# Patient Record
Sex: FEMALE | Race: WHITE | NOT HISPANIC OR LATINO | URBAN - METROPOLITAN AREA
[De-identification: names, ages, dates, MRNs, and addresses within clinical notes are randomized per-mention and may not be internally consistent; named-entity substitution may affect disease eponyms.]

---

## 2023-09-12 PROBLEM — F41.9 ANXIETY: Status: ACTIVE | Noted: 2023-09-12

## 2023-09-12 PROBLEM — H02.834 DERMATOCHALASIS OF LEFT UPPER EYELID: Status: ACTIVE | Noted: 2023-09-12

## 2023-09-12 PROBLEM — Z86.718 HISTORY OF DEEP VEIN THROMBOSIS: Status: ACTIVE | Noted: 2023-09-12

## 2023-09-12 PROBLEM — E87.6 HYPOKALEMIA: Status: ACTIVE | Noted: 2023-09-12

## 2023-09-12 PROBLEM — E78.5 DYSLIPIDEMIA: Status: ACTIVE | Noted: 2023-09-12

## 2023-09-12 PROBLEM — K44.9 DIAPHRAGMATIC HERNIA WITHOUT OBSTRUCTION OR GANGRENE: Status: ACTIVE | Noted: 2023-09-12

## 2023-09-12 PROBLEM — H04.129 TEAR FILM INSUFFICIENCY: Status: ACTIVE | Noted: 2023-09-12

## 2023-09-12 PROBLEM — Z96.652 H/O TOTAL KNEE REPLACEMENT, LEFT: Status: ACTIVE | Noted: 2023-09-12

## 2023-09-12 PROBLEM — R06.00 DYSPNEA: Status: ACTIVE | Noted: 2023-09-12

## 2023-09-12 PROBLEM — H35.40 PERIPHERAL RETINAL DEGENERATION: Status: ACTIVE | Noted: 2023-09-12

## 2023-09-12 PROBLEM — H43.399 FLOATERS IN VISUAL FIELD: Status: ACTIVE | Noted: 2023-09-12

## 2023-09-12 PROBLEM — H02.831 DERMATOCHALASIS OF RIGHT UPPER EYELID: Status: ACTIVE | Noted: 2023-09-12

## 2023-09-12 PROBLEM — D62 ANEMIA DUE TO ACUTE BLOOD LOSS: Status: ACTIVE | Noted: 2023-09-12

## 2023-09-12 RX ORDER — SIMETHICONE 80 MG
80 TABLET,CHEWABLE ORAL
COMMUNITY

## 2023-09-12 RX ORDER — HYDROCHLOROTHIAZIDE 25 MG/1
25 TABLET ORAL EVERY MORNING
COMMUNITY

## 2023-09-12 RX ORDER — DICLOFENAC SODIUM 10 MG/G
GEL TOPICAL
COMMUNITY

## 2023-09-12 RX ORDER — TEMAZEPAM 15 MG/1
15 CAPSULE ORAL NIGHTLY PRN
COMMUNITY

## 2023-09-12 RX ORDER — CALCIUM CARBONATE 200(500)MG
1 TABLET,CHEWABLE ORAL DAILY
COMMUNITY

## 2023-09-12 RX ORDER — BUSPIRONE HYDROCHLORIDE 10 MG/1
10 TABLET ORAL 2 TIMES DAILY
COMMUNITY

## 2023-09-12 RX ORDER — LUBIPROSTONE 8 UG/1
8 CAPSULE ORAL
COMMUNITY

## 2023-09-12 RX ORDER — CETIRIZINE HYDROCHLORIDE 10 MG/1
10 TABLET ORAL DAILY
COMMUNITY

## 2023-09-12 RX ORDER — GABAPENTIN 300 MG/1
300 CAPSULE ORAL AS NEEDED
COMMUNITY

## 2023-09-12 RX ORDER — AMLODIPINE BESYLATE 10 MG/1
10 TABLET ORAL DAILY
COMMUNITY

## 2023-09-12 RX ORDER — SUCRALFATE 1 G/1
1 TABLET ORAL
COMMUNITY

## 2023-09-12 RX ORDER — DICYCLOMINE HYDROCHLORIDE 10 MG/1
20 CAPSULE ORAL DAILY PRN
COMMUNITY

## 2023-09-12 RX ORDER — FENOFIBRATE 145 MG/1
145 TABLET, FILM COATED ORAL
COMMUNITY

## 2023-09-12 RX ORDER — MONTELUKAST SODIUM 10 MG/1
10 TABLET ORAL DAILY
COMMUNITY

## 2023-09-12 RX ORDER — OMEPRAZOLE 40 MG/1
40 CAPSULE, DELAYED RELEASE ORAL
COMMUNITY

## 2023-09-12 RX ORDER — PRAVASTATIN SODIUM 10 MG/1
10 TABLET ORAL DAILY
COMMUNITY

## 2023-09-12 RX ORDER — SERTRALINE HYDROCHLORIDE 100 MG/1
100 TABLET, FILM COATED ORAL DAILY
COMMUNITY

## 2023-09-12 RX ORDER — ERGOCALCIFEROL 1.25 MG/1
1 CAPSULE ORAL DAILY
COMMUNITY

## 2023-12-04 ENCOUNTER — HOSPITAL ENCOUNTER (OUTPATIENT)
Dept: RADIOLOGY | Facility: HOSPITAL | Age: 83
Discharge: HOME | End: 2023-12-04
Payer: MEDICARE

## 2023-12-04 DIAGNOSIS — M17.11 UNILATERAL PRIMARY OSTEOARTHRITIS, RIGHT KNEE: ICD-10-CM

## 2023-12-04 DIAGNOSIS — M76.899 OTHER SPECIFIED ENTHESOPATHIES OF UNSPECIFIED LOWER LIMB, EXCLUDING FOOT: ICD-10-CM

## 2023-12-04 PROCEDURE — 73562 X-RAY EXAM OF KNEE 3: CPT | Mod: RT,FY

## 2024-04-12 ENCOUNTER — HOSPITAL ENCOUNTER (OUTPATIENT)
Dept: RADIOLOGY | Facility: HOSPITAL | Age: 84
Discharge: HOME | End: 2024-04-12
Payer: MEDICARE

## 2024-04-12 VITALS — BODY MASS INDEX: 29.4 KG/M2 | WEIGHT: 194 LBS | HEIGHT: 68 IN

## 2024-04-12 DIAGNOSIS — N95.9 UNSPECIFIED MENOPAUSAL AND PERIMENOPAUSAL DISORDER: ICD-10-CM

## 2024-04-12 DIAGNOSIS — Z12.31 ENCOUNTER FOR SCREENING MAMMOGRAM FOR MALIGNANT NEOPLASM OF BREAST: ICD-10-CM

## 2024-04-12 PROCEDURE — 77067 SCR MAMMO BI INCL CAD: CPT | Performed by: RADIOLOGY

## 2024-04-12 PROCEDURE — 77080 DXA BONE DENSITY AXIAL: CPT | Performed by: RADIOLOGY

## 2024-04-12 PROCEDURE — 77080 DXA BONE DENSITY AXIAL: CPT

## 2024-04-12 PROCEDURE — 77063 BREAST TOMOSYNTHESIS BI: CPT | Performed by: RADIOLOGY

## 2024-04-12 PROCEDURE — 77067 SCR MAMMO BI INCL CAD: CPT

## 2024-07-16 ENCOUNTER — OFFICE VISIT (OUTPATIENT)
Dept: OPHTHALMOLOGY | Facility: CLINIC | Age: 84
End: 2024-07-16
Payer: MEDICARE

## 2024-07-16 ENCOUNTER — CLINICAL SUPPORT (OUTPATIENT)
Dept: OPHTHALMOLOGY | Facility: CLINIC | Age: 84
End: 2024-07-16
Payer: MEDICARE

## 2024-07-16 DIAGNOSIS — H02.831 DERMATOCHALASIS OF BOTH UPPER EYELIDS: ICD-10-CM

## 2024-07-16 DIAGNOSIS — Z96.1 PSEUDOPHAKIA: ICD-10-CM

## 2024-07-16 DIAGNOSIS — H04.123 INSUFFICIENCY OF TEAR FILM OF BOTH EYES: ICD-10-CM

## 2024-07-16 DIAGNOSIS — H43.393 VITREOUS FLOATERS OF BOTH EYES: ICD-10-CM

## 2024-07-16 DIAGNOSIS — H35.40 PERIPHERAL RETINAL DEGENERATION OF BOTH EYES: Primary | ICD-10-CM

## 2024-07-16 DIAGNOSIS — H52.7 REFRACTIVE ERROR: ICD-10-CM

## 2024-07-16 DIAGNOSIS — H02.834 DERMATOCHALASIS OF BOTH UPPER EYELIDS: ICD-10-CM

## 2024-07-16 PROBLEM — Z86.718 HISTORY OF DEEP VEIN THROMBOSIS: Status: RESOLVED | Noted: 2023-09-12 | Resolved: 2024-07-16

## 2024-07-16 PROCEDURE — 99214 OFFICE O/P EST MOD 30 MIN: CPT | Performed by: OPHTHALMOLOGY

## 2024-07-16 PROCEDURE — 92015 DETERMINE REFRACTIVE STATE: CPT | Performed by: OPHTHALMOLOGY

## 2024-07-16 PROCEDURE — 92134 CPTRZ OPH DX IMG PST SGM RTA: CPT | Performed by: OPHTHALMOLOGY

## 2024-07-16 RX ORDER — ALPRAZOLAM 0.5 MG/1
1 TABLET ORAL
COMMUNITY
Start: 2023-10-20

## 2024-07-16 RX ORDER — FLUTICASONE PROPIONATE 50 MCG
1 SPRAY, SUSPENSION (ML) NASAL DAILY
COMMUNITY
Start: 2024-01-18

## 2024-07-16 RX ORDER — LIDOCAINE 50 MG/G
PATCH TOPICAL
COMMUNITY
Start: 2024-05-31

## 2024-07-16 ASSESSMENT — REFRACTION_WEARINGRX
OS_CYLINDER: -0.75
OD_CYLINDER: -1.25
OD_ADD: +2.75
OS_ADD: +2.75
OD_AXIS: 091
SPECS_TYPE: BIFOCAL
OS_AXIS: 133
OD_SPHERE: -0.50
OS_SPHERE: +0.50

## 2024-07-16 ASSESSMENT — VISUAL ACUITY
OS_CC+: +1
CORRECTION_TYPE: GLASSES
METHOD: SNELLEN - SINGLE
OS_CC: 20/30
OD_CC+: -2
OD_CC: 20/30

## 2024-07-16 ASSESSMENT — REFRACTION_MANIFEST
OS_AXIS: 100
OD_AXIS: 090
OD_CYLINDER: -2.75
OS_SPHERE: +1.50
OS_CYLINDER: -1.25
OD_SPHERE: +0.25
METHOD_AUTOREFRACTION: 1

## 2024-07-16 ASSESSMENT — ENCOUNTER SYMPTOMS
NEUROLOGICAL NEGATIVE: 0
PSYCHIATRIC NEGATIVE: 0
EYES NEGATIVE: 0
ENDOCRINE NEGATIVE: 0
HEMATOLOGIC/LYMPHATIC NEGATIVE: 0
CARDIOVASCULAR NEGATIVE: 0
MUSCULOSKELETAL NEGATIVE: 0
RESPIRATORY NEGATIVE: 0
GASTROINTESTINAL NEGATIVE: 0
CONSTITUTIONAL NEGATIVE: 0
ALLERGIC/IMMUNOLOGIC NEGATIVE: 0

## 2024-07-16 ASSESSMENT — CUP TO DISC RATIO
OS_RATIO: 0.35
OD_RATIO: 0.35

## 2024-07-16 ASSESSMENT — KERATOMETRY
OS_AXISANGLE2_DEGREES: 135
OS_K2POWER_DIOPTERS: 43.75
OD_K1POWER_DIOPTERS: 41.75
OS_AXISANGLE_DEGREES: 45
OD_AXISANGLE2_DEGREES: 90
OS_K1POWER_DIOPTERS: 43.00
OD_K2POWER_DIOPTERS: 43.25
OD_AXISANGLE_DEGREES: 180
METHOD_AUTO_MANUAL: AUTOMATED

## 2024-07-16 ASSESSMENT — PATIENT HEALTH QUESTIONNAIRE - PHQ9
SUM OF ALL RESPONSES TO PHQ9 QUESTIONS 1 AND 2: 0
1. LITTLE INTEREST OR PLEASURE IN DOING THINGS: NOT AT ALL
2. FEELING DOWN, DEPRESSED OR HOPELESS: NOT AT ALL

## 2024-07-16 ASSESSMENT — PAIN SCALES - GENERAL: PAINLEVEL: 0-NO PAIN

## 2024-07-16 ASSESSMENT — TONOMETRY
IOP_METHOD: GOLDMANN APPLANATION
OD_IOP_MMHG: 16
OS_IOP_MMHG: 16

## 2024-07-16 ASSESSMENT — EXTERNAL EXAM - LEFT EYE: OS_EXAM: BROW PTOSIS

## 2024-07-16 ASSESSMENT — EXTERNAL EXAM - RIGHT EYE: OD_EXAM: BROW PTOSIS

## 2024-07-16 ASSESSMENT — SLIT LAMP EXAM - LIDS
COMMENTS: 1+ DERMATOCHALASIS - UPPER LID, 1+ BLEPHARITIS
COMMENTS: 1+ DERMATOCHALASIS - UPPER LID, 1+ BLEPHARITIS

## 2024-07-16 NOTE — PROGRESS NOTES
Subjective   Patient ID: Francine Romero is a 84 y.o. female.    Chief Complaint    Annual Exam       HPI    Noting difficulties focusing when not using lubricating drops (gtts).    Complete and macular exam.  No new changes in health history or meds.  Vision is good and stable.  No new problems or complaints.    Last edited by Alejandro Velasquez MD on 7/16/2024 11:32 AM.        No current outpatient medications on file. (Ophthalmology pharm classes)       Current Outpatient Medications (Other)   Medication Sig Dispense Refill    ALPRAZolam (Xanax) 0.5 mg tablet Take 1 tablet (0.5 mg) by mouth early in the morning..      amLODIPine (Norvasc) 10 mg tablet Take 1 tablet (10 mg) by mouth once daily.      busPIRone (Buspar) 10 mg tablet Take 1 tablet (10 mg) by mouth 2 times a day.      calcium carbonate (Tums) 200 mg calcium chewable tablet Chew 1 tablet (500 mg) once daily.      cetirizine (ZyrTEC) 10 mg tablet Take 1 tablet (10 mg) by mouth once daily.      diclofenac sodium (Voltaren) 1 % gel gel as directed Transdermal      dicyclomine (Bentyl) 10 mg capsule Take 2 capsules (20 mg) by mouth once daily as needed.      ergocalciferol (Vitamin D-2) 1.25 MG (75237 UT) capsule Take 1 capsule (1,250 mcg) by mouth once daily.      fenofibrate (Tricor) 145 mg tablet Take 1 tablet (145 mg) by mouth once daily in the evening. Take with meals.      fluticasone (Flonase) 50 mcg/actuation nasal spray Administer 1 spray into each nostril once daily. shake liquid      fluticasone furoate (Arnuity Ellipta) 50 mcg/actuation inhaler Inhale 2 puffs once daily.      gabapentin (Neurontin) 300 mg capsule Take 1 capsule (300 mg) by mouth if needed.      hydroCHLOROthiazide (HYDRODiuril) 25 mg tablet Take 1 tablet (25 mg) by mouth once daily in the morning.      lidocaine (Lidoderm) 5 % patch APPLY 1 PATCH TOPICALLY ONCE DAILY. REMOVE AFTER 12 HOURS      lubiprostone (Amitiza) 8 mcg capsule Take 1 capsule (8 mcg) by mouth 2 times daily  (morning and late afternoon).  1 capsule with food and water Orally Twice a day for 30 day(s)      montelukast (Singulair) 10 mg tablet Take 1 tablet (10 mg) by mouth once daily.      multivitamin/iron/folic acid (CENTRUM WOMEN ORAL) as directed Orally      omeprazole (PriLOSEC) 40 mg DR capsule Take 1 capsule (40 mg) by mouth once daily in the morning. Take before meals.      pedi mv no.227/ferrous sulfate (FLINTSTONES COMPLETE, FE SULF, ORAL) Take 1 tablet by mouth once daily.      pravastatin (Pravachol) 10 mg tablet Take 1 tablet (10 mg) by mouth once daily.      sertraline (Zoloft) 100 mg tablet Take 1 tablet (100 mg) by mouth once daily.      simethicone (Mylicon) 80 mg chewable tablet Chew 1 tablet (80 mg) 2 times a day.  1 tablet after meals and at bedtime as needed Orally Four times a day      sucralfate (Carafate) 1 gram tablet Take 1 tablet (1 g) by mouth once daily in the morning. Take before meals.      temazepam (Restoril) 15 mg capsule Take 1 capsule (15 mg) by mouth as needed at bedtime.         Objective   Base Eye Exam       Visual Acuity (Snellen - Single)         Right Left Both    Dist cc 20/30 -2 20/30 +1     Near cc   J1      Correction: Glasses              Tonometry (Goldmann Applanation, 10:40 AM)         Right Left    Pressure 16 16              Pupils         Dark Shape React APD    Right 4 Round 2 None    Left 4 Round 2 None              Extraocular Movement         Right Left     Full Full              Dilation       Both eyes: 1% Tropic 2.5% Phen @ 10:40 AM                  Additional Tests       Keratometry (Automated)         K1 Axis K2 Axis    Right 41.75 90 43.25 180    Left 43.00 135 43.75 45                  Slit Lamp and Fundus Exam       External Exam         Right Left    External Brow ptosis Brow ptosis              Slit Lamp Exam         Right Left    Lids/Lashes 1+ Dermatochalasis - upper lid, 1+ Blepharitis 1+ Dermatochalasis - upper lid, 1+ Blepharitis     Conjunctiva/Sclera normal bulbar and palepbral conjunctiva normal bulbar and palepbral conjunctiva    Cornea normal epi/stroma/endo and tear film normal epi/stroma/endo and tear film    Anterior Chamber normal anterior chamber, deep and quiet normal anterior chamber, deep and quiet    Iris iris normal iris normal    Lens BRJ Posterior chamber intraocular lens, Open posterior capsule BDP Centered posterior chamber intraocular lens, Open posterior capsule    Anterior Vitreous vitreous syneresis vitreous syneresis              Fundus Exam         Right Left    Disc normal optic nerve normal optic nerve    C/D Ratio 0.35 0.35    Macula normal macula normal macula    Vessels normal retinal vessels normal retinal vessels    Periphery peripheral retinal degeneration peripheral retinal degeneration                  Refraction       Wearing Rx         Sphere Cylinder Axis Add    Right -0.50 -1.25 091 +2.75    Left +0.50 -0.75 133 +2.75      Type: Bifocal              Manifest Refraction (Auto)         Sphere Cylinder Axis    Right +0.25 -2.75 090    Left +1.50 -1.25 100      Pupillary Distance: 62              Final Rx         Sphere Cylinder Hope Dist VA Add Near VA    Right -0.25 -1.50 090 20/30 +2.75 J1    Left +0.75 -0.75 100 20/25 +2.75 J1      Type: Bifocal    Expiration Date: 7/16/2026    Pupillary Distance: 62                    Assessment/Plan   Problem List Items Addressed This Visit          Eye/Vision problems    Dermatochalasis of both upper eyelids    Floaters in visual field    Peripheral retinal degeneration of both eyes - Primary    Relevant Orders    OCT, Retina - OU - Both Eyes (Completed)    Tear film insufficiency    Pseudophakia     F/u one year for a full exam.  Unchanged rx given.          Refractive error

## 2024-12-30 ENCOUNTER — OFFICE VISIT (OUTPATIENT)
Dept: CARDIOLOGY | Facility: CLINIC | Age: 84
End: 2024-12-30
Payer: MEDICARE

## 2024-12-30 VITALS
WEIGHT: 198 LBS | BODY MASS INDEX: 30.11 KG/M2 | DIASTOLIC BLOOD PRESSURE: 64 MMHG | OXYGEN SATURATION: 96 % | SYSTOLIC BLOOD PRESSURE: 112 MMHG | HEART RATE: 89 BPM

## 2024-12-30 DIAGNOSIS — R94.31 ABNORMAL EKG: Primary | ICD-10-CM

## 2024-12-30 PROCEDURE — 1159F MED LIST DOCD IN RCRD: CPT | Performed by: INTERNAL MEDICINE

## 2024-12-30 PROCEDURE — 1036F TOBACCO NON-USER: CPT | Performed by: INTERNAL MEDICINE

## 2024-12-30 PROCEDURE — 99213 OFFICE O/P EST LOW 20 MIN: CPT | Mod: 25 | Performed by: INTERNAL MEDICINE

## 2024-12-30 PROCEDURE — 1126F AMNT PAIN NOTED NONE PRSNT: CPT | Performed by: INTERNAL MEDICINE

## 2024-12-30 PROCEDURE — 93010 ELECTROCARDIOGRAM REPORT: CPT | Performed by: INTERNAL MEDICINE

## 2024-12-30 PROCEDURE — 93005 ELECTROCARDIOGRAM TRACING: CPT | Performed by: INTERNAL MEDICINE

## 2024-12-30 PROCEDURE — 99203 OFFICE O/P NEW LOW 30 MIN: CPT | Performed by: INTERNAL MEDICINE

## 2024-12-30 ASSESSMENT — ENCOUNTER SYMPTOMS
OCCASIONAL FEELINGS OF UNSTEADINESS: 0
ENDOCRINE NEGATIVE: 1
NEUROLOGICAL NEGATIVE: 1
COUGH: 0
DEPRESSION: 0
MUSCULOSKELETAL NEGATIVE: 1
RESPIRATORY NEGATIVE: 1
FALLS: 0
EYES NEGATIVE: 1
CONSTITUTIONAL NEGATIVE: 1
CHILLS: 0
GASTROINTESTINAL NEGATIVE: 1
LOSS OF SENSATION IN FEET: 1
FEVER: 0

## 2024-12-30 ASSESSMENT — PATIENT HEALTH QUESTIONNAIRE - PHQ9
SUM OF ALL RESPONSES TO PHQ9 QUESTIONS 1 AND 2: 0
SUM OF ALL RESPONSES TO PHQ9 QUESTIONS 1 AND 2: 0
1. LITTLE INTEREST OR PLEASURE IN DOING THINGS: NOT AT ALL
2. FEELING DOWN, DEPRESSED OR HOPELESS: NOT AT ALL
2. FEELING DOWN, DEPRESSED OR HOPELESS: NOT AT ALL
1. LITTLE INTEREST OR PLEASURE IN DOING THINGS: NOT AT ALL

## 2024-12-30 ASSESSMENT — PAIN SCALES - GENERAL: PAINLEVEL_OUTOF10: 0-NO PAIN

## 2024-12-30 NOTE — ASSESSMENT & PLAN NOTE
This is an 84-year-old white female who does have an abnormal EKG.  In 2020 the EKG was also abnormal and similar.  She has had stress test which are negative.  She has EKGs where the T waves are flipped and sometimes a heart.  This may be a normal variant for her.  Stress test in 2023 was negative.  Would like to get an echocardiogram to make sure there is no valvular or muscular problems she has no murmurs at this time that I can hear and of note there is no murmurs with Valsalva.  Will call with results of the echocardiogram

## 2024-12-30 NOTE — PROGRESS NOTES
Subjective      Chief Complaint   Patient presents with    dr hammonds referred for cardiac eval          This is an 84-year-old white female who is transferring to our service.  She does have an abnormal EKG which she has had for quite some time.  Her EKG today shows a normal sinus rhythm with flipped T waves in the anterior lateral leads.  She had a stress test in 2023 EKG at that time did not show this.  However going back EKG in 2020 did show these changes.  She has no symptoms of chest pain chest pressure or discomforts.  She never had a myocardial infarction.  She is not complaining of symptoms of PND or orthopnea.  Her legs are not swelling up on her.  She does have a history of hypertension as well as hypercholesterolemia.  She is not a diabetic there is no family's coronary disease she is never smoker and states she never a myocardial infarction.  There is no history of rheumatic fever or heart murmur.  She is never told she had muscle problem.  She remains active and is doing fairly well.  She has never had a stroke nor symptoms of intermittent claudication.           Review of Systems   Constitutional: Negative. Negative for chills and fever.   HENT: Negative.     Eyes: Negative.    Respiratory: Negative.  Negative for cough.    Endocrine: Negative.    Skin: Negative.    Musculoskeletal: Negative.  Negative for falls.   Gastrointestinal: Negative.    Genitourinary: Negative.    Neurological: Negative.    All other systems reviewed and are negative.       Past Surgical History:   Procedure Laterality Date    CATARACT EXTRACTION W/  INTRAOCULAR LENS IMPLANT Right 11/10/2002    +15.5 D    CATARACT EXTRACTION W/  INTRAOCULAR LENS IMPLANT Left 11/09/2017    +13.0 D    HYSTERECTOMY      KNEE ARTHROPLASTY      2022        Active Ambulatory Problems     Diagnosis Date Noted    H/O total knee replacement, left 09/12/2023    Anemia due to acute blood loss 09/12/2023    Anxiety 09/12/2023    Dermatochalasis of both  "upper eyelids 09/12/2023    Diaphragmatic hernia without obstruction or gangrene 09/12/2023    Dyslipidemia 09/12/2023    Dyspnea 09/12/2023    Floaters in visual field 09/12/2023    Hypokalemia 09/12/2023    Peripheral retinal degeneration of both eyes 09/12/2023    Tear film insufficiency 09/12/2023    Pseudophakia 07/16/2024    Refractive error 07/16/2024    Abnormal EKG 12/30/2024     Resolved Ambulatory Problems     Diagnosis Date Noted    Dermatochalasis of left upper eyelid 09/12/2023    History of deep vein thrombosis 09/12/2023     Past Medical History:   Diagnosis Date    Dry eye syndrome of bilateral lacrimal glands     Other vitreous opacities, bilateral     Presence of intraocular lens     S/P YAG capsulotomy, bilateral     Unspecified disorder of refraction     Unspecified peripheral retinal degeneration         Visit Vitals  /64   Pulse 89   Wt 89.8 kg (198 lb)   LMP  (LMP Unknown)   SpO2 96%   BMI 30.11 kg/m²   OB Status Hysterectomy   Smoking Status Former   BSA 2.08 m²        Objective     Physical Exam       Lab Review:         Lab Results   Component Value Date    CHOL 167 05/21/2020     Lab Results   Component Value Date    HDL 63 05/21/2020     Lab Results   Component Value Date    LDLCALC 84 05/21/2020     Lab Results   Component Value Date    TRIG 101 05/21/2020     No components found for: \"CHOLHDL\"     Assessment/Plan     Abnormal EKG  This is an 84-year-old white female who does have an abnormal EKG.  In 2020 the EKG was also abnormal and similar.  She has had stress test which are negative.  She has EKGs where the T waves are flipped and sometimes a heart.  This may be a normal variant for her.  Stress test in 2023 was negative.  Would like to get an echocardiogram to make sure there is no valvular or muscular problems she has no murmurs at this time that I can hear and of note there is no murmurs with Valsalva.  Will call with results of the echocardiogram     "

## 2025-01-09 ENCOUNTER — HOSPITAL ENCOUNTER (OUTPATIENT)
Dept: CARDIOLOGY | Facility: HOSPITAL | Age: 85
Discharge: HOME | End: 2025-01-09
Payer: MEDICARE

## 2025-01-09 DIAGNOSIS — R94.31 ABNORMAL EKG: ICD-10-CM

## 2025-01-09 LAB
AORTIC VALVE PEAK VELOCITY: 1.48 M/S
AV PEAK GRADIENT: 9 MMHG
AVA (PEAK VEL): 2.76 CM2
EJECTION FRACTION APICAL 4 CHAMBER: 74.8
EJECTION FRACTION: 63 %
LEFT ATRIUM VOLUME AREA LENGTH INDEX BSA: 31.2 ML/M2
LEFT VENTRICULAR OUTFLOW TRACT DIAMETER: 2.06 CM
LV EJECTION FRACTION BIPLANE: 68 %
MITRAL VALVE E/A RATIO: 0.85
MITRAL VALVE E/E' RATIO: 7.81
RIGHT VENTRICLE FREE WALL PEAK S': 15.57 CM/S
RIGHT VENTRICLE PEAK SYSTOLIC PRESSURE: 22.2 MMHG

## 2025-01-09 PROCEDURE — 93306 TTE W/DOPPLER COMPLETE: CPT

## 2025-01-09 PROCEDURE — 93306 TTE W/DOPPLER COMPLETE: CPT | Performed by: INTERNAL MEDICINE

## 2025-01-13 ENCOUNTER — TELEPHONE (OUTPATIENT)
Dept: CARDIOLOGY | Facility: CLINIC | Age: 85
End: 2025-01-13
Payer: MEDICARE

## 2025-01-13 NOTE — TELEPHONE ENCOUNTER
Called with results of the echocardiogram showing no abnormalities and reassurance with the EKG being abnormal for quite some time

## 2025-04-25 ENCOUNTER — HOSPITAL ENCOUNTER (OUTPATIENT)
Dept: RADIOLOGY | Facility: HOSPITAL | Age: 85
Discharge: HOME | End: 2025-04-25
Payer: MEDICARE

## 2025-04-25 ENCOUNTER — APPOINTMENT (OUTPATIENT)
Dept: CARDIOLOGY | Facility: HOSPITAL | Age: 85
DRG: 505 | End: 2025-04-25
Payer: MEDICARE

## 2025-04-25 ENCOUNTER — HOSPITAL ENCOUNTER (INPATIENT)
Facility: HOSPITAL | Age: 85
LOS: 3 days | Discharge: NURSING, REHAB OR LONG-TERM CARE | DRG: 505 | End: 2025-04-29
Attending: INTERNAL MEDICINE | Admitting: INTERNAL MEDICINE
Payer: MEDICARE

## 2025-04-25 DIAGNOSIS — S92.309A CLOSED DISPLACED FRACTURE OF METATARSAL BONE, UNSPECIFIED LATERALITY, UNSPECIFIED METATARSAL, INITIAL ENCOUNTER: Primary | ICD-10-CM
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
## 2025-04-25 LAB
ABO GROUP (TYPE) IN BLOOD: NORMAL
ABO GROUP (TYPE) IN BLOOD: NORMAL
ALBUMIN SERPL BCP-MCNC: 4.3 G/DL (ref 3.4–5)
ALP SERPL-CCNC: 78 U/L (ref 33–136)
ALT SERPL W P-5'-P-CCNC: 14 U/L (ref 7–45)
ANION GAP SERPL CALCULATED.3IONS-SCNC: 12 MMOL/L (ref 10–20)
ANTIBODY SCREEN: NORMAL
AST SERPL W P-5'-P-CCNC: 22 U/L (ref 9–39)
BASOPHILS # BLD AUTO: 0.13 X10*3/UL (ref 0–0.1)
BASOPHILS NFR BLD AUTO: 1.7 %
BILIRUB SERPL-MCNC: 0.7 MG/DL (ref 0–1.2)
BUN SERPL-MCNC: 23 MG/DL (ref 6–23)
CALCIUM SERPL-MCNC: 10 MG/DL (ref 8.6–10.3)
CHLORIDE SERPL-SCNC: 103 MMOL/L (ref 98–107)
CO2 SERPL-SCNC: 28 MMOL/L (ref 21–32)
CREAT SERPL-MCNC: 0.84 MG/DL (ref 0.5–1.05)
EGFRCR SERPLBLD CKD-EPI 2021: 68 ML/MIN/1.73M*2
EOSINOPHIL # BLD AUTO: 0.29 X10*3/UL (ref 0–0.4)
EOSINOPHIL NFR BLD AUTO: 3.8 %
ERYTHROCYTE [DISTWIDTH] IN BLOOD BY AUTOMATED COUNT: 13.2 % (ref 11.5–14.5)
GLUCOSE SERPL-MCNC: 97 MG/DL (ref 74–99)
HCT VFR BLD AUTO: 40.4 % (ref 36–46)
HGB BLD-MCNC: 13.7 G/DL (ref 12–16)
IMM GRANULOCYTES # BLD AUTO: 0.03 X10*3/UL (ref 0–0.5)
IMM GRANULOCYTES NFR BLD AUTO: 0.4 % (ref 0–0.9)
LYMPHOCYTES # BLD AUTO: 1.66 X10*3/UL (ref 0.8–3)
LYMPHOCYTES NFR BLD AUTO: 21.8 %
MCH RBC QN AUTO: 31.6 PG (ref 26–34)
MCHC RBC AUTO-ENTMCNC: 33.9 G/DL (ref 32–36)
MCV RBC AUTO: 93 FL (ref 80–100)
MONOCYTES # BLD AUTO: 0.61 X10*3/UL (ref 0.05–0.8)
MONOCYTES NFR BLD AUTO: 8 %
NEUTROPHILS # BLD AUTO: 4.88 X10*3/UL (ref 1.6–5.5)
NEUTROPHILS NFR BLD AUTO: 64.3 %
NRBC BLD-RTO: 0 /100 WBCS (ref 0–0)
PLATELET # BLD AUTO: 376 X10*3/UL (ref 150–450)
POTASSIUM SERPL-SCNC: 3.8 MMOL/L (ref 3.5–5.3)
PROT SERPL-MCNC: 7.7 G/DL (ref 6.4–8.2)
RBC # BLD AUTO: 4.34 X10*6/UL (ref 4–5.2)
RH FACTOR (ANTIGEN D): NORMAL
RH FACTOR (ANTIGEN D): NORMAL
SODIUM SERPL-SCNC: 139 MMOL/L (ref 136–145)
WBC # BLD AUTO: 7.6 X10*3/UL (ref 4.4–11.3)

## 2025-04-25 PROCEDURE — G0378 HOSPITAL OBSERVATION PER HR: HCPCS

## 2025-04-25 PROCEDURE — 86901 BLOOD TYPING SEROLOGIC RH(D): CPT | Performed by: PHYSICIAN ASSISTANT

## 2025-04-25 PROCEDURE — 73630 X-RAY EXAM OF FOOT: CPT | Mod: LT

## 2025-04-25 PROCEDURE — 2500000001 HC RX 250 WO HCPCS SELF ADMINISTERED DRUGS (ALT 637 FOR MEDICARE OP): Performed by: INTERNAL MEDICINE

## 2025-04-25 PROCEDURE — 99285 EMERGENCY DEPT VISIT HI MDM: CPT

## 2025-04-25 PROCEDURE — 2500000002 HC RX 250 W HCPCS SELF ADMINISTERED DRUGS (ALT 637 FOR MEDICARE OP, ALT 636 FOR OP/ED): Performed by: INTERNAL MEDICINE

## 2025-04-25 PROCEDURE — 73610 X-RAY EXAM OF ANKLE: CPT | Mod: LT

## 2025-04-25 PROCEDURE — 85025 COMPLETE CBC W/AUTO DIFF WBC: CPT | Performed by: PHYSICIAN ASSISTANT

## 2025-04-25 PROCEDURE — 84075 ASSAY ALKALINE PHOSPHATASE: CPT | Performed by: PHYSICIAN ASSISTANT

## 2025-04-25 PROCEDURE — 93005 ELECTROCARDIOGRAM TRACING: CPT

## 2025-04-25 PROCEDURE — 36415 COLL VENOUS BLD VENIPUNCTURE: CPT | Performed by: PHYSICIAN ASSISTANT

## 2025-04-25 PROCEDURE — 73564 X-RAY EXAM KNEE 4 OR MORE: CPT | Mod: 50

## 2025-04-25 RX ORDER — LUBIPROSTONE 8 UG/1
8 CAPSULE ORAL
Status: DISCONTINUED | OUTPATIENT
Start: 2025-04-26 | End: 2025-04-29 | Stop reason: HOSPADM

## 2025-04-25 RX ORDER — TEMAZEPAM 15 MG/1
15 CAPSULE ORAL NIGHTLY PRN
Status: DISCONTINUED | OUTPATIENT
Start: 2025-04-25 | End: 2025-04-29 | Stop reason: HOSPADM

## 2025-04-25 RX ORDER — ACETAMINOPHEN 500 MG
1000 TABLET ORAL EVERY 6 HOURS PRN
Status: DISCONTINUED | OUTPATIENT
Start: 2025-04-25 | End: 2025-04-29 | Stop reason: HOSPADM

## 2025-04-25 RX ORDER — IBUPROFEN 400 MG/1
400 TABLET ORAL EVERY 6 HOURS PRN
Status: DISCONTINUED | OUTPATIENT
Start: 2025-04-25 | End: 2025-04-29 | Stop reason: HOSPADM

## 2025-04-25 RX ORDER — FENOFIBRATE 160 MG/1
160 TABLET ORAL DAILY
Status: DISCONTINUED | OUTPATIENT
Start: 2025-04-26 | End: 2025-04-29 | Stop reason: HOSPADM

## 2025-04-25 RX ORDER — SERTRALINE HYDROCHLORIDE 100 MG/1
100 TABLET, FILM COATED ORAL DAILY
Status: DISCONTINUED | OUTPATIENT
Start: 2025-04-26 | End: 2025-04-29 | Stop reason: HOSPADM

## 2025-04-25 RX ORDER — GABAPENTIN 300 MG/1
300 CAPSULE ORAL 2 TIMES DAILY PRN
Status: DISCONTINUED | OUTPATIENT
Start: 2025-04-25 | End: 2025-04-29 | Stop reason: HOSPADM

## 2025-04-25 RX ORDER — PRAVASTATIN SODIUM 10 MG/1
10 TABLET ORAL NIGHTLY
Status: DISCONTINUED | OUTPATIENT
Start: 2025-04-25 | End: 2025-04-29 | Stop reason: HOSPADM

## 2025-04-25 RX ORDER — SUCRALFATE 1 G/1
1 TABLET ORAL DAILY PRN
Status: DISCONTINUED | OUTPATIENT
Start: 2025-04-26 | End: 2025-04-29 | Stop reason: HOSPADM

## 2025-04-25 RX ORDER — CALCIUM CARBONATE 200(500)MG
1 TABLET,CHEWABLE ORAL DAILY PRN
Status: DISCONTINUED | OUTPATIENT
Start: 2025-04-25 | End: 2025-04-29 | Stop reason: HOSPADM

## 2025-04-25 RX ORDER — AMLODIPINE BESYLATE 10 MG/1
10 TABLET ORAL DAILY
Status: DISCONTINUED | OUTPATIENT
Start: 2025-04-26 | End: 2025-04-29 | Stop reason: HOSPADM

## 2025-04-25 RX ORDER — ALPRAZOLAM 0.5 MG/1
0.5 TABLET ORAL
Status: DISCONTINUED | OUTPATIENT
Start: 2025-04-26 | End: 2025-04-29 | Stop reason: HOSPADM

## 2025-04-25 RX ORDER — IBUPROFEN 200 MG
200-400 TABLET ORAL EVERY 6 HOURS PRN
COMMUNITY

## 2025-04-25 RX ORDER — MONTELUKAST SODIUM 10 MG/1
10 TABLET ORAL
Status: DISCONTINUED | OUTPATIENT
Start: 2025-04-26 | End: 2025-04-29 | Stop reason: HOSPADM

## 2025-04-25 RX ORDER — CETIRIZINE HYDROCHLORIDE 10 MG/1
10 TABLET ORAL DAILY PRN
Status: DISCONTINUED | OUTPATIENT
Start: 2025-04-25 | End: 2025-04-29 | Stop reason: HOSPADM

## 2025-04-25 RX ORDER — FLUTICASONE PROPIONATE 50 MCG
1 SPRAY, SUSPENSION (ML) NASAL DAILY PRN
Status: DISCONTINUED | OUTPATIENT
Start: 2025-04-25 | End: 2025-04-29 | Stop reason: HOSPADM

## 2025-04-25 RX ORDER — ERGOCALCIFEROL 1.25 MG/1
1.25 CAPSULE ORAL WEEKLY
Status: DISCONTINUED | OUTPATIENT
Start: 2025-04-25 | End: 2025-04-29 | Stop reason: HOSPADM

## 2025-04-25 RX ORDER — BUSPIRONE HYDROCHLORIDE 10 MG/1
10 TABLET ORAL 2 TIMES DAILY
Status: DISCONTINUED | OUTPATIENT
Start: 2025-04-25 | End: 2025-04-29 | Stop reason: HOSPADM

## 2025-04-25 RX ORDER — PANTOPRAZOLE SODIUM 20 MG/1
20 TABLET, DELAYED RELEASE ORAL
Status: DISCONTINUED | OUTPATIENT
Start: 2025-04-26 | End: 2025-04-29 | Stop reason: HOSPADM

## 2025-04-25 RX ORDER — SIMETHICONE 80 MG
80 TABLET,CHEWABLE ORAL 4 TIMES DAILY PRN
Status: DISCONTINUED | OUTPATIENT
Start: 2025-04-25 | End: 2025-04-29 | Stop reason: HOSPADM

## 2025-04-25 RX ORDER — ACETAMINOPHEN 500 MG
1000 TABLET ORAL EVERY 6 HOURS PRN
COMMUNITY

## 2025-04-25 RX ORDER — HYDROCHLOROTHIAZIDE 25 MG/1
25 TABLET ORAL EVERY MORNING
Status: DISCONTINUED | OUTPATIENT
Start: 2025-04-26 | End: 2025-04-29 | Stop reason: HOSPADM

## 2025-04-25 RX ADMIN — ERGOCALCIFEROL 1.25 MG: 1.25 CAPSULE ORAL at 22:14

## 2025-04-25 RX ADMIN — TEMAZEPAM 15 MG: 15 CAPSULE ORAL at 22:14

## 2025-04-25 RX ADMIN — PRAVASTATIN SODIUM 10 MG: 10 TABLET ORAL at 23:14

## 2025-04-25 RX ADMIN — ACETAMINOPHEN 1000 MG: 500 TABLET ORAL at 22:17

## 2025-04-25 RX ADMIN — BUSPIRONE HYDROCHLORIDE 10 MG: 10 TABLET ORAL at 22:14

## 2025-04-25 SDOH — SOCIAL STABILITY: SOCIAL INSECURITY
WITHIN THE LAST YEAR, HAVE YOU BEEN RAPED OR FORCED TO HAVE ANY KIND OF SEXUAL ACTIVITY BY YOUR PARTNER OR EX-PARTNER?: NO

## 2025-04-25 SDOH — ECONOMIC STABILITY: HOUSING INSECURITY: IN THE PAST 12 MONTHS, HOW MANY TIMES HAVE YOU MOVED WHERE YOU WERE LIVING?: 1

## 2025-04-25 SDOH — ECONOMIC STABILITY: FOOD INSECURITY: WITHIN THE PAST 12 MONTHS, YOU WORRIED THAT YOUR FOOD WOULD RUN OUT BEFORE YOU GOT THE MONEY TO BUY MORE.: NEVER TRUE

## 2025-04-25 SDOH — SOCIAL STABILITY: SOCIAL INSECURITY: HAVE YOU HAD THOUGHTS OF HARMING ANYONE ELSE?: NO

## 2025-04-25 SDOH — SOCIAL STABILITY: SOCIAL NETWORK
DO YOU BELONG TO ANY CLUBS OR ORGANIZATIONS SUCH AS CHURCH GROUPS, UNIONS, FRATERNAL OR ATHLETIC GROUPS, OR SCHOOL GROUPS?: PATIENT DECLINED

## 2025-04-25 SDOH — SOCIAL STABILITY: SOCIAL NETWORK
IN A TYPICAL WEEK, HOW MANY TIMES DO YOU TALK ON THE PHONE WITH FAMILY, FRIENDS, OR NEIGHBORS?: MORE THAN THREE TIMES A WEEK

## 2025-04-25 SDOH — ECONOMIC STABILITY: FOOD INSECURITY: HOW HARD IS IT FOR YOU TO PAY FOR THE VERY BASICS LIKE FOOD, HOUSING, MEDICAL CARE, AND HEATING?: NOT HARD AT ALL

## 2025-04-25 SDOH — ECONOMIC STABILITY: HOUSING INSECURITY: IN THE LAST 12 MONTHS, WAS THERE A TIME WHEN YOU WERE NOT ABLE TO PAY THE MORTGAGE OR RENT ON TIME?: NO

## 2025-04-25 SDOH — SOCIAL STABILITY: SOCIAL NETWORK: HOW OFTEN DO YOU ATTEND MEETINGS OF THE CLUBS OR ORGANIZATIONS YOU BELONG TO?: PATIENT DECLINED

## 2025-04-25 SDOH — SOCIAL STABILITY: SOCIAL INSECURITY: WITHIN THE LAST YEAR, HAVE YOU BEEN HUMILIATED OR EMOTIONALLY ABUSED IN OTHER WAYS BY YOUR PARTNER OR EX-PARTNER?: NO

## 2025-04-25 SDOH — SOCIAL STABILITY: SOCIAL NETWORK: HOW OFTEN DO YOU GET TOGETHER WITH FRIENDS OR RELATIVES?: MORE THAN THREE TIMES A WEEK

## 2025-04-25 SDOH — SOCIAL STABILITY: SOCIAL NETWORK: HOW OFTEN DO YOU ATTEND CHURCH OR RELIGIOUS SERVICES?: MORE THAN 4 TIMES PER YEAR

## 2025-04-25 SDOH — ECONOMIC STABILITY: HOUSING INSECURITY: AT ANY TIME IN THE PAST 12 MONTHS, WERE YOU HOMELESS OR LIVING IN A SHELTER (INCLUDING NOW)?: NO

## 2025-04-25 SDOH — HEALTH STABILITY: PHYSICAL HEALTH: ON AVERAGE, HOW MANY DAYS PER WEEK DO YOU ENGAGE IN MODERATE TO STRENUOUS EXERCISE (LIKE A BRISK WALK)?: 7 DAYS

## 2025-04-25 SDOH — SOCIAL STABILITY: SOCIAL INSECURITY: ARE THERE ANY APPARENT SIGNS OF INJURIES/BEHAVIORS THAT COULD BE RELATED TO ABUSE/NEGLECT?: NO

## 2025-04-25 SDOH — SOCIAL STABILITY: SOCIAL INSECURITY: WITHIN THE LAST YEAR, HAVE YOU BEEN AFRAID OF YOUR PARTNER OR EX-PARTNER?: NO

## 2025-04-25 SDOH — ECONOMIC STABILITY: FOOD INSECURITY: WITHIN THE PAST 12 MONTHS, THE FOOD YOU BOUGHT JUST DIDN'T LAST AND YOU DIDN'T HAVE MONEY TO GET MORE.: NEVER TRUE

## 2025-04-25 SDOH — SOCIAL STABILITY: SOCIAL INSECURITY: ABUSE: ADULT

## 2025-04-25 SDOH — HEALTH STABILITY: PHYSICAL HEALTH
HOW OFTEN DO YOU NEED TO HAVE SOMEONE HELP YOU WHEN YOU READ INSTRUCTIONS, PAMPHLETS, OR OTHER WRITTEN MATERIAL FROM YOUR DOCTOR OR PHARMACY?: OFTEN

## 2025-04-25 SDOH — SOCIAL STABILITY: SOCIAL INSECURITY: DOES ANYONE TRY TO KEEP YOU FROM HAVING/CONTACTING OTHER FRIENDS OR DOING THINGS OUTSIDE YOUR HOME?: NO

## 2025-04-25 SDOH — SOCIAL STABILITY: SOCIAL INSECURITY: HAS ANYONE EVER THREATENED TO HURT YOUR FAMILY OR YOUR PETS?: NO

## 2025-04-25 SDOH — HEALTH STABILITY: MENTAL HEALTH
DO YOU FEEL STRESS - TENSE, RESTLESS, NERVOUS, OR ANXIOUS, OR UNABLE TO SLEEP AT NIGHT BECAUSE YOUR MIND IS TROUBLED ALL THE TIME - THESE DAYS?: ONLY A LITTLE

## 2025-04-25 SDOH — SOCIAL STABILITY: SOCIAL INSECURITY
WITHIN THE LAST YEAR, HAVE YOU BEEN KICKED, HIT, SLAPPED, OR OTHERWISE PHYSICALLY HURT BY YOUR PARTNER OR EX-PARTNER?: NO

## 2025-04-25 SDOH — ECONOMIC STABILITY: INCOME INSECURITY: IN THE PAST 12 MONTHS HAS THE ELECTRIC, GAS, OIL, OR WATER COMPANY THREATENED TO SHUT OFF SERVICES IN YOUR HOME?: NO

## 2025-04-25 SDOH — SOCIAL STABILITY: SOCIAL INSECURITY: ARE YOU MARRIED, WIDOWED, DIVORCED, SEPARATED, NEVER MARRIED, OR LIVING WITH A PARTNER?: WIDOWED

## 2025-04-25 SDOH — SOCIAL STABILITY: SOCIAL INSECURITY: ARE YOU OR HAVE YOU BEEN THREATENED OR ABUSED PHYSICALLY, EMOTIONALLY, OR SEXUALLY BY ANYONE?: NO

## 2025-04-25 SDOH — SOCIAL STABILITY: SOCIAL INSECURITY: DO YOU FEEL ANYONE HAS EXPLOITED OR TAKEN ADVANTAGE OF YOU FINANCIALLY OR OF YOUR PERSONAL PROPERTY?: NO

## 2025-04-25 SDOH — SOCIAL STABILITY: SOCIAL INSECURITY: HAVE YOU HAD ANY THOUGHTS OF HARMING ANYONE ELSE?: NO

## 2025-04-25 SDOH — HEALTH STABILITY: PHYSICAL HEALTH: ON AVERAGE, HOW MANY MINUTES DO YOU ENGAGE IN EXERCISE AT THIS LEVEL?: 10 MIN

## 2025-04-25 SDOH — ECONOMIC STABILITY: TRANSPORTATION INSECURITY: IN THE PAST 12 MONTHS, HAS LACK OF TRANSPORTATION KEPT YOU FROM MEDICAL APPOINTMENTS OR FROM GETTING MEDICATIONS?: NO

## 2025-04-25 SDOH — SOCIAL STABILITY: SOCIAL INSECURITY: DO YOU FEEL UNSAFE GOING BACK TO THE PLACE WHERE YOU ARE LIVING?: NO

## 2025-04-25 SDOH — SOCIAL STABILITY: SOCIAL INSECURITY: WERE YOU ABLE TO COMPLETE ALL THE BEHAVIORAL HEALTH SCREENINGS?: YES

## 2025-04-25 ASSESSMENT — ACTIVITIES OF DAILY LIVING (ADL)
ADEQUATE_TO_COMPLETE_ADL: YES
LACK_OF_TRANSPORTATION: NO
BATHING: INDEPENDENT
GROOMING: INDEPENDENT
PATIENT'S MEMORY ADEQUATE TO SAFELY COMPLETE DAILY ACTIVITIES?: YES
FEEDING YOURSELF: INDEPENDENT
LACK_OF_TRANSPORTATION: NO
WALKS IN HOME: INDEPENDENT
HEARING - RIGHT EAR: FUNCTIONAL
ASSISTIVE_DEVICE: EYEGLASSES;CANE
LACK_OF_TRANSPORTATION: NO
DRESSING YOURSELF: INDEPENDENT
JUDGMENT_ADEQUATE_SAFELY_COMPLETE_DAILY_ACTIVITIES: YES
HEARING - LEFT EAR: FUNCTIONAL
TOILETING: INDEPENDENT

## 2025-04-25 ASSESSMENT — COGNITIVE AND FUNCTIONAL STATUS - GENERAL
DAILY ACTIVITIY SCORE: 21
TOILETING: A LITTLE
HELP NEEDED FOR BATHING: A LITTLE
DRESSING REGULAR LOWER BODY CLOTHING: A LITTLE
WALKING IN HOSPITAL ROOM: A LOT
STANDING UP FROM CHAIR USING ARMS: A LOT
MOBILITY SCORE: 17
MOVING TO AND FROM BED TO CHAIR: A LITTLE
PATIENT BASELINE BEDBOUND: NO
CLIMB 3 TO 5 STEPS WITH RAILING: A LOT

## 2025-04-25 ASSESSMENT — PAIN DESCRIPTION - LOCATION: LOCATION: LEG

## 2025-04-25 ASSESSMENT — LIFESTYLE VARIABLES
HOW OFTEN DO YOU HAVE A DRINK CONTAINING ALCOHOL: NEVER
TOTAL SCORE: 0
HOW OFTEN DO YOU HAVE 6 OR MORE DRINKS ON ONE OCCASION: NEVER
EVER HAD A DRINK FIRST THING IN THE MORNING TO STEADY YOUR NERVES TO GET RID OF A HANGOVER: NO
EVER FELT BAD OR GUILTY ABOUT YOUR DRINKING: NO
HAVE YOU EVER FELT YOU SHOULD CUT DOWN ON YOUR DRINKING: NO
HAVE PEOPLE ANNOYED YOU BY CRITICIZING YOUR DRINKING: NO
PRESCIPTION_ABUSE_PAST_12_MONTHS: NO
SUBSTANCE_ABUSE_PAST_12_MONTHS: NO
AUDIT-C TOTAL SCORE: 0
HOW MANY STANDARD DRINKS CONTAINING ALCOHOL DO YOU HAVE ON A TYPICAL DAY: PATIENT DOES NOT DRINK
SKIP TO QUESTIONS 9-10: 1
AUDIT-C TOTAL SCORE: 0

## 2025-04-25 ASSESSMENT — PAIN SCALES - GENERAL
PAINLEVEL_OUTOF10: 2
PAINLEVEL_OUTOF10: 2
PAINLEVEL_OUTOF10: 7
PAINLEVEL_OUTOF10: 3

## 2025-04-25 ASSESSMENT — PAIN - FUNCTIONAL ASSESSMENT
PAIN_FUNCTIONAL_ASSESSMENT: 0-10
PAIN_FUNCTIONAL_ASSESSMENT: 0-10

## 2025-04-25 ASSESSMENT — PATIENT HEALTH QUESTIONNAIRE - PHQ9
SUM OF ALL RESPONSES TO PHQ9 QUESTIONS 1 & 2: 0
2. FEELING DOWN, DEPRESSED OR HOPELESS: NOT AT ALL
1. LITTLE INTEREST OR PLEASURE IN DOING THINGS: NOT AT ALL

## 2025-04-25 ASSESSMENT — COLUMBIA-SUICIDE SEVERITY RATING SCALE - C-SSRS
6. HAVE YOU EVER DONE ANYTHING, STARTED TO DO ANYTHING, OR PREPARED TO DO ANYTHING TO END YOUR LIFE?: NO
2. HAVE YOU ACTUALLY HAD ANY THOUGHTS OF KILLING YOURSELF?: NO
1. IN THE PAST MONTH, HAVE YOU WISHED YOU WERE DEAD OR WISHED YOU COULD GO TO SLEEP AND NOT WAKE UP?: NO

## 2025-04-25 ASSESSMENT — PAIN DESCRIPTION - ORIENTATION: ORIENTATION: LEFT

## 2025-04-25 ASSESSMENT — PAIN DESCRIPTION - DESCRIPTORS
DESCRIPTORS: ACHING;DISCOMFORT
DESCRIPTORS: DISCOMFORT

## 2025-04-25 NOTE — PROGRESS NOTES
Pharmacy Medication History Review    Francine Romero is a 84 y.o. female. Pharmacy reviewed the patient's [id number] medications and allergies for accuracy.    Medications ADDED:  Acetaminophen 500mg  Ibuprofen 200mg   Medications CHANGED:  None   Medications REMOVED:   Diclofenac 1% gel  Arnuity Ellipta   Lidocaine 5% patch  Dicyclomine 10mg      The list below reflects the updated PTA list. Comments regarding how patient may be taking medications differently can be found in the Admit Orders Activity  Prior to Admission Medications   Prescriptions Last Dose Informant   ALPRAZolam (Xanax) 0.5 mg tablet Past Week Family Member, Self   Sig: Take 1 tablet (0.5 mg) by mouth early in the morning..   acetaminophen (Tylenol) 500 mg tablet 4/25/2025 Family Member, Self   Sig: Take 2 tablets (1,000 mg) by mouth every 6 hours if needed for mild pain (1 - 3).   amLODIPine (Norvasc) 10 mg tablet 4/25/2025 Family Member, Self   Sig: Take 1 tablet (10 mg) by mouth once daily.   busPIRone (Buspar) 10 mg tablet 4/25/2025 Family Member, Self   Sig: Take 1 tablet (10 mg) by mouth 2 times a day.   calcium carbonate (Tums) 200 mg calcium chewable tablet Unknown Family Member, Self   Sig: Chew 1 tablet (500 mg) once daily. prn   cetirizine (ZyrTEC) 10 mg tablet 4/25/2025 Family Member, Self   Sig: Take 1 tablet (10 mg) by mouth once daily. prrn   ergocalciferol (Vitamin D-2) 1.25 MG (21354 UT) capsule 4/18/2025 Family Member, Self   Sig: Take 1 capsule (50,000 Units) by mouth 1 (one) time per week.   fenofibrate (Tricor) 145 mg tablet 4/24/2025 Evening Family Member, Self   Sig: Take 1 tablet (145 mg) by mouth once daily in the evening. Take with meals.   fluticasone (Flonase) 50 mcg/actuation nasal spray 4/25/2025 Family Member, Self   Sig: Administer 1 spray into each nostril once daily. shake liquid prn   gabapentin (Neurontin) 300 mg capsule 4/24/2025 Family Member, Self   Sig: Take 1 capsule (300 mg) by mouth 2 times a  day. PRN   hydroCHLOROthiazide (HYDRODiuril) 25 mg tablet 4/25/2025 Family Member, Self   Sig: Take 1 tablet (25 mg) by mouth once daily in the morning.   ibuprofen 200 mg tablet 4/24/2025 Family Member, Self   Sig: Take 1-2 tablets (200-400 mg) by mouth every 6 hours if needed for mild pain (1 - 3).   lubiprostone (Amitiza) 8 mcg capsule 4/24/2025 Family Member, Self   Sig: Take 1 capsule (8 mcg) by mouth 2 times daily (morning and late afternoon).  1 capsule with food and water Orally Twice a day for 30 day(s)   montelukast (Singulair) 10 mg tablet 4/24/2025 Family Member, Self   Sig: Take 1 tablet (10 mg) by mouth once daily.   multivitamin/iron/folic acid (CENTRUM WOMEN ORAL) 4/25/2025 Family Member, Self   Sig: as directed Orally   omeprazole (PriLOSEC) 40 mg DR capsule Unknown Family Member, Self   Sig: Take 1 capsule (40 mg) by mouth once daily in the morning. Take before meals.   pravastatin (Pravachol) 10 mg tablet 4/24/2025 Evening Family Member, Self   Sig: Take 1 tablet (10 mg) by mouth once daily.   sertraline (Zoloft) 100 mg tablet 4/24/2025 Evening Family Member, Self   Sig: Take 1 tablet (100 mg) by mouth once daily.   simethicone (Mylicon) 80 mg chewable tablet 4/24/2025 Family Member, Self   Sig: Chew 1 tablet (80 mg) 2 times a day.  1 tablet after meals and at bedtime as needed Orally Four times a day   sucralfate (Carafate) 1 gram tablet 4/23/2025 Family Member, Self   Sig: Take 1 tablet (1 g) by mouth once daily in the morning. Take before meals. PRN   temazepam (Restoril) 15 mg capsule 4/24/2025 Evening Family Member, Self   Sig: Take 1 capsule (15 mg) by mouth as needed at bedtime.      Facility-Administered Medications: None        The list below reflects the updated allergy list. Please review each documented allergy for additional clarification and justification.  Allergies  Reviewed by Daria Givens CPhT on 4/25/2025        Severity Reactions Comments    Pregabalin Medium Other  Tingling around the mouth     Eszopiclone Low Dizziness             Pharmacy has been updated to [facility].    Sources used to complete the med history include dispense history, PTA medication list, patient interview. Patient is a good historian.    Below are additional concerns with the patient's PTA list.  None     Daria Givens, CPhT-Adv  Please reach out via [employer] Secure Chat for questions

## 2025-04-25 NOTE — ED TRIAGE NOTES
Patient fell two weeks ago, has been walking on her foot, today she received an xray and was found to have a broken foot.

## 2025-04-25 NOTE — CONSULTS
PODIATRIC MEDICINE & SURGERY - CONSULT NOTE    HPI    Patient is an 84 y.o. non-diabetic female with PMHx as below. Podiatry consulted for left foot fracture. Patient seen in ED. Daughter at bedside. Patient moved to a new home recently. She says her current pedal fracture is related to an injury that occurred 3 weeks ago. She twisted and stepped down while trying to kill a bug, and landed on her foot the wrong way. She did not have much pain at the time. She did not have any bruising. She has had swelling. She has taken OTC Tylenol since then. Currently, her pain is a 3 out of 10. She is surprised that she did not have more pain. Upon questioning, she does note that she has spinal stenosis and takes gabapentin. She uses a cane to ambulate        ROS: Per HPI.      Medical History[1]      Medications and Allergies reviewed.      PHYSICAL EXAM    Vitals:    04/25/25 1333   BP: (!) 124/94   Pulse: 80   Resp: 18   Temp: 36.5 °C (97.7 °F)   SpO2: 96%       General: Alert. NAD. Pleasant. Cooperative.    LEFT FOCUSED    Vasc:   DP and PT pulses are palpable b/l. CFT brisk to hallux.   Skin temperature is warm to warm from proximal to distal.   Edema diffuse of the foot.  Light bluish discoloration of the dorsal foot.    Neuro:   Light touch intact to LLE.     MSK:   Ankle ROM intact.   Moving all extremities spontaneously  Tender with palpation over the dorsolateral foot.    Derm:     Edema diffuse of the foot. Ecchymosis of the dorsal foot, in line with fractured metatarsal areas.  Toenails 1-5 intact.  No wounds.  Skin is supple        RESULTS    CBC  Lab Results   Component Value Date    WBC 7.6 04/25/2025    HGB 13.7 04/25/2025    HCT 40.4 04/25/2025    MCV 93 04/25/2025     04/25/2025         HgbA1c  Lab Results   Component Value Date    HGBA1C 5.5 10/15/2024       XR LEFT FOOT 3+ VIEWS / ANKLE 3+ VIEWS 4/25/25    FINDINGS:  Left foot:  There is comminuted fracture of the mid and distal shaft of the  4th  metatarsal noted with the distal fracture fragments displaced  medially by the width of the shaft and with mild angulation at the  fracture site with the apex directed medially. Additionally, there is  a comminuted fracture of the mid to distal shaft of the 5th  metatarsal noted with the major fracture fragments anatomically  aligned. There is also fracture through the base of the 5th  metatarsal with intra-articular extension.      Left ankle:  Plantar calcaneal spur is seen. There is enthesopathy of the  posterior calcaneus. No fracture or dislocation of the left ankle is  seen.      IMPRESSION:  Acute intra-articular fracture of the 5th metatarsal base with acute  comminuted fractures of the mid and distal shafts of the 4th and 5th  metatarsals.      No fracture or dislocation of the left ankle.        ASSESSMENT AND PLAN    Acute closed displaced fracture of metatarsal bones 4 and 5, left (initial encounter)  Osteopenia  Peripheral neuropathy 2/2 spinal stenosis    - Patient was evaluated in the ED. XR left foot / ankle reviewed with patient and daughter.  - Posterior splint applied to LLE.   - NWB to left lower extremity.  - Patient is being admitted per primary team preference.   - Will add on patient for ORIF of left foot fractures, possibly Monday 4/28/25.   - Above was discussed with patient and daughter, along with postoperative course. They verbalized understanding and agreement.       This patient was evaluated with the attending, Dr. Sarbjit Mills DPM.  Aimee Quiñones DPM PGY2  Podiatric Medicine & Surgery         [1]   Past Medical History:  Diagnosis Date    Dry eye syndrome of bilateral lacrimal glands     Other vitreous opacities, bilateral     Presence of intraocular lens     S/P YAG capsulotomy, bilateral     OD 2007, OS 2022    Unspecified disorder of refraction     Unspecified peripheral retinal degeneration

## 2025-04-25 NOTE — CARE PLAN
The patient's goals for the shift include Try and rest.    The clinical goals for the shift include Manage pain

## 2025-04-25 NOTE — ED PROVIDER NOTES
HPI   Chief Complaint   Patient presents with    broken foot       84-year-old female presenting emergency department with a chief complaint of fracture to her left foot.  She injured it about 2 weeks ago try to kill a bug.  She has been walking on her foot for the last 2 weeks although painful with a limp using a cane.  She denies any other injury.  She had outpatient x-ray with her physician today which indicated a fracture and was subsequently referred to the emergency department.  She is otherwise well-appearing nontoxic afebrile.              Patient History   Medical History[1]  Surgical History[2]  Family History[3]  Social History[4]    Physical Exam   ED Triage Vitals [04/25/25 1333]   Temperature Heart Rate Respirations BP   36.5 °C (97.7 °F) 80 18 (!) 124/94      Pulse Ox Temp Source Heart Rate Source Patient Position   96 % Temporal Monitor Sitting      BP Location FiO2 (%)     Left arm --       Physical Exam  Vitals and nursing note reviewed.   Constitutional:       Appearance: Normal appearance.   HENT:      Head: Normocephalic.      Nose: Nose normal.      Mouth/Throat:      Mouth: Mucous membranes are moist.   Cardiovascular:      Rate and Rhythm: Normal rate and regular rhythm.      Pulses: Normal pulses.   Pulmonary:      Effort: Pulmonary effort is normal.      Breath sounds: Normal breath sounds.   Abdominal:      General: Abdomen is flat.      Palpations: Abdomen is soft.   Musculoskeletal:         General: Normal range of motion.      Cervical back: Normal range of motion.      Comments: Tenderness dorsum left foot   Skin:     General: Skin is warm.      Comments: Bruising to dorsum left foot   Neurological:      General: No focal deficit present.      Mental Status: She is alert and oriented to person, place, and time.   Psychiatric:         Mood and Affect: Mood normal.           ED Course & MDM   ED Course as of 04/25/25 1620   Fri Apr 25, 2025   1446 ECG 12 lead  Performed at  1434, HR of  72, NSR, NAD, QTc 315, no sign of STEMI, no Q wave or T wave abnormality noted.    Reviewed and interpreted by me at time performed   [JM]      ED Course User Index  [JM] Ermelinda Bhardwaj MD         Diagnoses as of 04/25/25 1620   Closed displaced fracture of metatarsal bone, unspecified laterality, unspecified metatarsal, initial encounter                 No data recorded     [place] Coma Scale Score: 15 (04/25/25 1335 : Mary Arce RN)                           Medical Decision Making  I have seen and evaluated this patient.  Physician available for consultation.  Vital signs have been reviewed.  All laboratory and diagnostic imaging is reviewed by myself and interpreted by myself unless otherwise stated.  Additionally imaging is interpreted by radiologist.    Patient with fracture fourth fifth metatarsal podiatry consulted recommended mission for surgery.  Patient mated to internal medicine service.  Neurovascular intact, admitted for further treatment.    Labs Reviewed  CBC WITH AUTO DIFFERENTIAL - Abnormal     WBC                           7.6                    nRBC                          0.0                    RBC                           4.34                   Hemoglobin                    13.7                   Hematocrit                    40.4                   MCV                           93                     MCH                           31.6                   MCHC                          33.9                   RDW                           13.2                   Platelets                     376                    Neutrophils %                 64.3                   Immature Granulocytes %, Automated   0.4                    Lymphocytes %                 21.8                   Monocytes %                   8.0                    Eosinophils %                 3.8                    Basophils %                   1.7                    Neutrophils Absolute          4.88                    Immature Granulocytes Absolute, Au*   0.03                   Lymphocytes Absolute          1.66                   Monocytes Absolute            0.61                   Eosinophils Absolute          0.29                   Basophils Absolute            0.13 (*)            COMPREHENSIVE METABOLIC PANEL - Normal     Glucose                       97                     Sodium                        139                    Potassium                     3.8                    Chloride                      103                    Bicarbonate                   28                     Anion Gap                     12                     Urea Nitrogen                 23                     Creatinine                    0.84                   eGFR                          68                     Calcium                       10.0                   Albumin                       4.3                    Alkaline Phosphatase          78                     Total Protein                 7.7                    AST                           22                     Bilirubin, Total              0.7                    ALT                           14                  TYPE AND SCREEN     ABO TYPE                      B                      Rh TYPE                       POS                    ANTIBODY SCREEN               NEG                 VERAB/VERIFY ABORH  No orders to display  Medications  acetaminophen (Tylenol) tablet 1,000 mg (1,000 mg oral Given 4/25/25 2217)  ALPRAZolam (Xanax) tablet 0.5 mg (has no administration in time range)  amLODIPine (Norvasc) tablet 10 mg (has no administration in time range)  busPIRone (Buspar) tablet 10 mg (10 mg oral Given 4/25/25 2214)  calcium carbonate (Tums) chewable tablet 500 mg (has no administration in time range)  cetirizine (ZyrTEC) tablet 10 mg (has no administration in time range)  ergocalciferol (Vitamin D-2) capsule 1.25 mg (1.25 mg oral Given 4/25/25 2214)  fenofibrate (Triglide) tablet 160  mg (has no administration in time range)  fluticasone (Flonase) nasal spray 1 spray (has no administration in time range)  gabapentin (Neurontin) capsule 300 mg (has no administration in time range)  hydroCHLOROthiazide (HYDRODiuril) tablet 25 mg (has no administration in time range)  ibuprofen tablet 400 mg (has no administration in time range)  lubiprostone (Amitiza) capsule 8 mcg (has no administration in time range)  montelukast (Singulair) tablet 10 mg (has no administration in time range)  pantoprazole (ProtoNix) EC tablet 20 mg (has no administration in time range)  pravastatin (Pravachol) tablet 10 mg (10 mg oral Given 4/25/25 2314)  sertraline (Zoloft) tablet 100 mg (has no administration in time range)  simethicone (Mylicon) chewable tablet 80 mg (has no administration in time range)  sucralfate (Carafate) tablet 1 g (has no administration in time range)  temazepam (Restoril) capsule 15 mg (15 mg oral Given 4/25/25 2214)  Current Discharge Medication List                Procedure  Procedures         [1]   Past Medical History:  Diagnosis Date    Dry eye syndrome of bilateral lacrimal glands     Other vitreous opacities, bilateral     Presence of intraocular lens     S/P YAG capsulotomy, bilateral     OD 2007, OS 2022    Unspecified disorder of refraction     Unspecified peripheral retinal degeneration    [2]   Past Surgical History:  Procedure Laterality Date    CATARACT EXTRACTION W/  INTRAOCULAR LENS IMPLANT Right 11/10/2002    +15.5 D    CATARACT EXTRACTION W/  INTRAOCULAR LENS IMPLANT Left 11/09/2017    +13.0 D    HYSTERECTOMY      KNEE ARTHROPLASTY      2022   [3]   Family History  Problem Relation Name Age of Onset    Hypertension Mother      Dementia Mother      Hyperlipidemia Mother      COPD Father      Stroke Son      Hypertension Son     [4]   Social History  Tobacco Use    Smoking status: Former     Types: Cigarettes    Smokeless tobacco: Never    Tobacco comments:     Quit 37 years    Vaping  Use    Vaping status: Never Used   Substance Use Topics    Alcohol use: Not Currently     Comment: rare    Drug use: Never        Alexis Fish PA-C  04/25/25 6803

## 2025-04-26 PROCEDURE — 1200000002 HC GENERAL ROOM WITH TELEMETRY DAILY

## 2025-04-26 PROCEDURE — 2500000002 HC RX 250 W HCPCS SELF ADMINISTERED DRUGS (ALT 637 FOR MEDICARE OP, ALT 636 FOR OP/ED): Performed by: INTERNAL MEDICINE

## 2025-04-26 PROCEDURE — 97161 PT EVAL LOW COMPLEX 20 MIN: CPT | Mod: GP | Performed by: PHYSICAL THERAPIST

## 2025-04-26 PROCEDURE — 2500000001 HC RX 250 WO HCPCS SELF ADMINISTERED DRUGS (ALT 637 FOR MEDICARE OP): Performed by: INTERNAL MEDICINE

## 2025-04-26 PROCEDURE — 2500000004 HC RX 250 GENERAL PHARMACY W/ HCPCS (ALT 636 FOR OP/ED): Performed by: INTERNAL MEDICINE

## 2025-04-26 RX ORDER — HEPARIN SODIUM 5000 [USP'U]/ML
5000 INJECTION, SOLUTION INTRAVENOUS; SUBCUTANEOUS EVERY 8 HOURS
Status: DISCONTINUED | OUTPATIENT
Start: 2025-04-26 | End: 2025-04-29 | Stop reason: HOSPADM

## 2025-04-26 RX ORDER — ASPIRIN 325 MG
1.25 TABLET, DELAYED RELEASE (ENTERIC COATED) ORAL WEEKLY
Status: DISCONTINUED | OUTPATIENT
Start: 2025-04-26 | End: 2025-04-26 | Stop reason: SDUPTHER

## 2025-04-26 RX ADMIN — BUSPIRONE HYDROCHLORIDE 10 MG: 10 TABLET ORAL at 08:48

## 2025-04-26 RX ADMIN — BUSPIRONE HYDROCHLORIDE 10 MG: 10 TABLET ORAL at 20:36

## 2025-04-26 RX ADMIN — SERTRALINE 100 MG: 100 TABLET, FILM COATED ORAL at 20:36

## 2025-04-26 RX ADMIN — PANTOPRAZOLE SODIUM 20 MG: 20 TABLET, DELAYED RELEASE ORAL at 06:20

## 2025-04-26 RX ADMIN — AMLODIPINE BESYLATE 10 MG: 10 TABLET ORAL at 08:48

## 2025-04-26 RX ADMIN — FENOFIBRATE 160 MG: 160 TABLET ORAL at 20:36

## 2025-04-26 RX ADMIN — SIMETHICONE 80 MG: 80 TABLET, CHEWABLE ORAL at 13:55

## 2025-04-26 RX ADMIN — HEPARIN SODIUM 5000 UNITS: 5000 INJECTION INTRAVENOUS; SUBCUTANEOUS at 20:36

## 2025-04-26 RX ADMIN — PRAVASTATIN SODIUM 10 MG: 10 TABLET ORAL at 20:36

## 2025-04-26 RX ADMIN — ALPRAZOLAM 0.5 MG: 0.5 TABLET ORAL at 06:20

## 2025-04-26 RX ADMIN — MONTELUKAST 10 MG: 10 TABLET, FILM COATED ORAL at 20:36

## 2025-04-26 RX ADMIN — HYDROCHLOROTHIAZIDE 25 MG: 25 TABLET ORAL at 08:48

## 2025-04-26 SDOH — SOCIAL STABILITY: SOCIAL NETWORK
DO YOU BELONG TO ANY CLUBS OR ORGANIZATIONS SUCH AS CHURCH GROUPS, UNIONS, FRATERNAL OR ATHLETIC GROUPS, OR SCHOOL GROUPS?: NO

## 2025-04-26 SDOH — SOCIAL STABILITY: SOCIAL INSECURITY: ARE YOU MARRIED, WIDOWED, DIVORCED, SEPARATED, NEVER MARRIED, OR LIVING WITH A PARTNER?: WIDOWED

## 2025-04-26 SDOH — ECONOMIC STABILITY: FOOD INSECURITY: WITHIN THE PAST 12 MONTHS, THE FOOD YOU BOUGHT JUST DIDN'T LAST AND YOU DIDN'T HAVE MONEY TO GET MORE.: NEVER TRUE

## 2025-04-26 SDOH — HEALTH STABILITY: PHYSICAL HEALTH: ON AVERAGE, HOW MANY MINUTES DO YOU ENGAGE IN EXERCISE AT THIS LEVEL?: 10 MIN

## 2025-04-26 SDOH — SOCIAL STABILITY: SOCIAL INSECURITY: WITHIN THE LAST YEAR, HAVE YOU BEEN HUMILIATED OR EMOTIONALLY ABUSED IN OTHER WAYS BY YOUR PARTNER OR EX-PARTNER?: NO

## 2025-04-26 SDOH — ECONOMIC STABILITY: FOOD INSECURITY: WITHIN THE PAST 12 MONTHS, YOU WORRIED THAT YOUR FOOD WOULD RUN OUT BEFORE YOU GOT THE MONEY TO BUY MORE.: NEVER TRUE

## 2025-04-26 SDOH — ECONOMIC STABILITY: FOOD INSECURITY: HOW HARD IS IT FOR YOU TO PAY FOR THE VERY BASICS LIKE FOOD, HOUSING, MEDICAL CARE, AND HEATING?: NOT HARD AT ALL

## 2025-04-26 SDOH — HEALTH STABILITY: MENTAL HEALTH: HOW OFTEN DO YOU HAVE SIX OR MORE DRINKS ON ONE OCCASION?: NEVER

## 2025-04-26 SDOH — ECONOMIC STABILITY: INCOME INSECURITY: IN THE PAST 12 MONTHS HAS THE ELECTRIC, GAS, OIL, OR WATER COMPANY THREATENED TO SHUT OFF SERVICES IN YOUR HOME?: NO

## 2025-04-26 SDOH — SOCIAL STABILITY: SOCIAL NETWORK: HOW OFTEN DO YOU ATTEND MEETINGS OF THE CLUBS OR ORGANIZATIONS YOU BELONG TO?: NEVER

## 2025-04-26 SDOH — ECONOMIC STABILITY: HOUSING INSECURITY: AT ANY TIME IN THE PAST 12 MONTHS, WERE YOU HOMELESS OR LIVING IN A SHELTER (INCLUDING NOW)?: NO

## 2025-04-26 SDOH — ECONOMIC STABILITY: HOUSING INSECURITY: IN THE LAST 12 MONTHS, WAS THERE A TIME WHEN YOU WERE NOT ABLE TO PAY THE MORTGAGE OR RENT ON TIME?: NO

## 2025-04-26 SDOH — HEALTH STABILITY: MENTAL HEALTH: HOW MANY DRINKS CONTAINING ALCOHOL DO YOU HAVE ON A TYPICAL DAY WHEN YOU ARE DRINKING?: PATIENT DOES NOT DRINK

## 2025-04-26 SDOH — HEALTH STABILITY: MENTAL HEALTH: HOW OFTEN DO YOU HAVE A DRINK CONTAINING ALCOHOL?: NEVER

## 2025-04-26 SDOH — ECONOMIC STABILITY: TRANSPORTATION INSECURITY: IN THE PAST 12 MONTHS, HAS LACK OF TRANSPORTATION KEPT YOU FROM MEDICAL APPOINTMENTS OR FROM GETTING MEDICATIONS?: NO

## 2025-04-26 SDOH — SOCIAL STABILITY: SOCIAL NETWORK: HOW OFTEN DO YOU GET TOGETHER WITH FRIENDS OR RELATIVES?: MORE THAN THREE TIMES A WEEK

## 2025-04-26 SDOH — ECONOMIC STABILITY: HOUSING INSECURITY: IN THE PAST 12 MONTHS, HOW MANY TIMES HAVE YOU MOVED WHERE YOU WERE LIVING?: 1

## 2025-04-26 SDOH — SOCIAL STABILITY: SOCIAL INSECURITY: WITHIN THE LAST YEAR, HAVE YOU BEEN AFRAID OF YOUR PARTNER OR EX-PARTNER?: NO

## 2025-04-26 SDOH — SOCIAL STABILITY: SOCIAL NETWORK: HOW OFTEN DO YOU ATTEND CHURCH OR RELIGIOUS SERVICES?: MORE THAN 4 TIMES PER YEAR

## 2025-04-26 SDOH — HEALTH STABILITY: PHYSICAL HEALTH: ON AVERAGE, HOW MANY DAYS PER WEEK DO YOU ENGAGE IN MODERATE TO STRENUOUS EXERCISE (LIKE A BRISK WALK)?: 7 DAYS

## 2025-04-26 ASSESSMENT — COGNITIVE AND FUNCTIONAL STATUS - GENERAL
MOBILITY SCORE: 18
TOILETING: A LITTLE
TOILETING: A LITTLE
STANDING UP FROM CHAIR USING ARMS: A LITTLE
WALKING IN HOSPITAL ROOM: A LITTLE
MOVING FROM LYING ON BACK TO SITTING ON SIDE OF FLAT BED WITH BEDRAILS: A LITTLE
EATING MEALS: A LITTLE
MOBILITY SCORE: 11
DRESSING REGULAR LOWER BODY CLOTHING: A LITTLE
MOBILITY SCORE: 18
DRESSING REGULAR UPPER BODY CLOTHING: A LITTLE
PERSONAL GROOMING: A LITTLE
CLIMB 3 TO 5 STEPS WITH RAILING: TOTAL
STANDING UP FROM CHAIR USING ARMS: A LITTLE
DRESSING REGULAR UPPER BODY CLOTHING: A LITTLE
WALKING IN HOSPITAL ROOM: A LOT
MOVING TO AND FROM BED TO CHAIR: A LITTLE
HELP NEEDED FOR BATHING: A LITTLE
DAILY ACTIVITIY SCORE: 20
DAILY ACTIVITIY SCORE: 18
HELP NEEDED FOR BATHING: A LITTLE
STANDING UP FROM CHAIR USING ARMS: A LOT
WALKING IN HOSPITAL ROOM: TOTAL
TURNING FROM BACK TO SIDE WHILE IN FLAT BAD: A LITTLE
TURNING FROM BACK TO SIDE WHILE IN FLAT BAD: A LITTLE
CLIMB 3 TO 5 STEPS WITH RAILING: A LITTLE
MOVING FROM LYING ON BACK TO SITTING ON SIDE OF FLAT BED WITH BEDRAILS: A LITTLE
DRESSING REGULAR LOWER BODY CLOTHING: A LITTLE
CLIMB 3 TO 5 STEPS WITH RAILING: A LOT
MOVING TO AND FROM BED TO CHAIR: TOTAL
MOVING TO AND FROM BED TO CHAIR: A LITTLE

## 2025-04-26 ASSESSMENT — ACTIVITIES OF DAILY LIVING (ADL)
LACK_OF_TRANSPORTATION: NO
ADL_ASSISTANCE: INDEPENDENT

## 2025-04-26 ASSESSMENT — LIFESTYLE VARIABLES
SKIP TO QUESTIONS 9-10: 1
AUDIT-C TOTAL SCORE: 0

## 2025-04-26 ASSESSMENT — PAIN SCALES - GENERAL
PAINLEVEL_OUTOF10: 0 - NO PAIN

## 2025-04-26 ASSESSMENT — PAIN - FUNCTIONAL ASSESSMENT
PAIN_FUNCTIONAL_ASSESSMENT: 0-10

## 2025-04-26 NOTE — H&P
History Of Present Illness  Francine Romero is a 84 y.o. female presenting with complaint of fall and was found to have a left foot fracture.  The patient recently moved to assisted living.  The patient says she had a fall about 3 weeks ago and was and came to the emergency room.  In the emergency room initial workup was done and patient was discharged.  She twisted and stepped down while trying to kill a bug and landed on her foot the wrong way.  She did not have much pain at that time.  She did not have any bruise.  She had some swelling.  Patient was taking over-the-counter Tylenol.  Patient also take gabapentin for low back pain and spinal stenosis.  Patient denies any fever, chills, diarrhea, cough, fluctuation, nausea, vomiting, diarrhea, dysuria, hematuria or frequency.     Past Medical History  Medical History[1]    Surgical History  Surgical History[2]     Social History  She reports that she has quit smoking. Her smoking use included cigarettes. She has never used smokeless tobacco. She reports that she does not currently use alcohol. She reports that she does not use drugs.    Family History  Family History[3]     Allergies  Pregabalin and Eszopiclone    Review of Systems  I reviewed all systems reviewed as above otherwise is negative.  Physical Exam  HEENT:  Head externally atraumatic, no pallor, no icterus, extraocular movements intact, pupils reactive to light, oral mucosa moist and throat clear.  Neck:  Supple, no JVP, no palpable adenopathy or thyromegaly.  No carotid bruit.  Chest:  Clear to auscultation and resonant.  Heart:  Regular rate and rhythm, no murmur or gallop could be appreciated.  Abdomen:  Soft, nontender, bowel sounds present, normoactive, no palpable hepatosplenomegaly.  Extremities:  No edema, pulses present, no cyanosis or clubbing.  CNS:  Patient alert, oriented to time, place and person.  Power 5/5 all over and deep tendon reflexes symmetrical, cranial nerves 2-12 grossly  "intact.  Skin:  No active rash.  Musculoskeletal:  No joint swelling or erythema, range of movement normal.  Last Recorded Vitals  Heart Rate:  [77-80]   Temp:  [36.5 °C (97.7 °F)-36.6 °C (97.9 °F)]   Resp:  [18]   BP: (124-138)/(77-94)   Height:  [172.7 cm (5' 8\")]   Weight:  [86.2 kg (190 lb)]   SpO2:  [95 %-96 %]       Relevant Results        Results for orders placed or performed during the hospital encounter of 04/25/25 (from the past 24 hours)   ECG 12 lead   Result Value Ref Range    Ventricular Rate 72 BPM    Atrial Rate 72 BPM    [place] Interval 138 ms    QRS Duration 84 ms    QT Interval 288 ms    QTC Calculation(Bazett) 315 ms    P Axis 21 degrees    R Axis -3 degrees    T Axis 41 degrees    QRS Count 12 beats    Q Onset 227 ms    P Onset 158 ms    P Offset 202 ms    T Offset 371 ms    QTC Fredericia 306 ms   CBC and Auto Differential   Result Value Ref Range    WBC 7.6 4.4 - 11.3 x10*3/uL    nRBC 0.0 0.0 - 0.0 /100 WBCs    RBC 4.34 4.00 - 5.20 x10*6/uL    Hemoglobin 13.7 12.0 - 16.0 g/dL    Hematocrit 40.4 36.0 - 46.0 %    MCV 93 80 - 100 fL    MCH 31.6 26.0 - 34.0 pg    MCHC 33.9 32.0 - 36.0 g/dL    RDW 13.2 11.5 - 14.5 %    Platelets 376 150 - 450 x10*3/uL    Neutrophils % 64.3 40.0 - 80.0 %    Immature Granulocytes %, Automated 0.4 0.0 - 0.9 %    Lymphocytes % 21.8 13.0 - 44.0 %    Monocytes % 8.0 2.0 - 10.0 %    Eosinophils % 3.8 0.0 - 6.0 %    Basophils % 1.7 0.0 - 2.0 %    Neutrophils Absolute 4.88 1.60 - 5.50 x10*3/uL    Immature Granulocytes Absolute, Automated 0.03 0.00 - 0.50 x10*3/uL    Lymphocytes Absolute 1.66 0.80 - 3.00 x10*3/uL    Monocytes Absolute 0.61 0.05 - 0.80 x10*3/uL    Eosinophils Absolute 0.29 0.00 - 0.40 x10*3/uL    Basophils Absolute 0.13 (H) 0.00 - 0.10 x10*3/uL   Comprehensive metabolic panel   Result Value Ref Range    Glucose 97 74 - 99 mg/dL    Sodium 139 136 - 145 mmol/L    Potassium 3.8 3.5 - 5.3 mmol/L    Chloride 103 98 - 107 mmol/L    Bicarbonate 28 21 - 32 mmol/L    " Anion Gap 12 10 - 20 mmol/L    Urea Nitrogen 23 6 - 23 mg/dL    Creatinine 0.84 0.50 - 1.05 mg/dL    eGFR 68 >60 mL/min/1.73m*2    Calcium 10.0 8.6 - 10.3 mg/dL    Albumin 4.3 3.4 - 5.0 g/dL    Alkaline Phosphatase 78 33 - 136 U/L    Total Protein 7.7 6.4 - 8.2 g/dL    AST 22 9 - 39 U/L    Bilirubin, Total 0.7 0.0 - 1.2 mg/dL    ALT 14 7 - 45 U/L   Type And Screen   Result Value Ref Range    ABO TYPE B     Rh TYPE POS     ANTIBODY SCREEN NEG    VERIFY ABO/Rh Group Test   Result Value Ref Range    ABO TYPE B     Rh TYPE POS      Prior to Admission medications    Medication Sig Start Date End Date Taking? Authorizing Provider   acetaminophen (Tylenol) 500 mg tablet Take 2 tablets (1,000 mg) by mouth every 6 hours if needed for mild pain (1 - 3).   Yes Historical Provider, MD   ALPRAZolam (Xanax) 0.5 mg tablet Take 1 tablet (0.5 mg) by mouth early in the morning.. 10/20/23  Yes Historical Provider, MD   amLODIPine (Norvasc) 10 mg tablet Take 1 tablet (10 mg) by mouth once daily.   Yes Historical Provider, MD   busPIRone (Buspar) 10 mg tablet Take 1 tablet (10 mg) by mouth 2 times a day.   Yes Historical Provider, MD   cetirizine (ZyrTEC) 10 mg tablet Take 1 tablet (10 mg) by mouth once daily. prrn   Yes Historical Provider, MD   fenofibrate (Tricor) 145 mg tablet Take 1 tablet (145 mg) by mouth once daily in the evening. Take with meals.   Yes Historical Provider, MD   fluticasone (Flonase) 50 mcg/actuation nasal spray Administer 1 spray into each nostril once daily. shake liquid prn 1/18/24  Yes Historical Provider, MD   gabapentin (Neurontin) 300 mg capsule Take 1 capsule (300 mg) by mouth 2 times a day. PRN   Yes Historical Provider, MD   hydroCHLOROthiazide (HYDRODiuril) 25 mg tablet Take 1 tablet (25 mg) by mouth once daily in the morning.   Yes Historical Provider, MD   ibuprofen 200 mg tablet Take 1-2 tablets (200-400 mg) by mouth every 6 hours if needed for mild pain (1 - 3).   Yes Historical Provider, MD    lubiprostone (Amitiza) 8 mcg capsule Take 1 capsule (8 mcg) by mouth 2 times daily (morning and late afternoon).  1 capsule with food and water Orally Twice a day for 30 day(s)   Yes Historical Provider, MD   montelukast (Singulair) 10 mg tablet Take 1 tablet (10 mg) by mouth once daily.   Yes Historical Provider, MD   multivitamin/iron/folic acid (CENTRUM WOMEN ORAL) as directed Orally   Yes Historical Provider, MD   pravastatin (Pravachol) 10 mg tablet Take 1 tablet (10 mg) by mouth once daily.   Yes Historical Provider, MD   sertraline (Zoloft) 100 mg tablet Take 1 tablet (100 mg) by mouth once daily.   Yes Historical Provider, MD   simethicone (Mylicon) 80 mg chewable tablet Chew 1 tablet (80 mg) 2 times a day.  1 tablet after meals and at bedtime as needed Orally Four times a day   Yes Historical Provider, MD   temazepam (Restoril) 15 mg capsule Take 1 capsule (15 mg) by mouth as needed at bedtime.   Yes Historical Provider, MD   calcium carbonate (Tums) 200 mg calcium chewable tablet Chew 1 tablet (500 mg) once daily. prn    Historical Provider, MD   ergocalciferol (Vitamin D-2) 1.25 MG (18918 UT) capsule Take 1 capsule (50,000 Units) by mouth 1 (one) time per week.    Historical Provider, MD   omeprazole (PriLOSEC) 40 mg DR capsule Take 1 capsule (40 mg) by mouth once daily in the morning. Take before meals.    Historical Provider, MD   sucralfate (Carafate) 1 gram tablet Take 1 tablet (1 g) by mouth once daily in the morning. Take before meals. PRN    Historical Provider, MD   diclofenac sodium (Voltaren) 1 % gel gel prn  4/25/25  Historical Provider, MD   dicyclomine (Bentyl) 10 mg capsule Take 2 capsules (20 mg) by mouth once daily as needed.  4/25/25  Historical Provider, MD   fluticasone furoate (Arnuity Ellipta) 50 mcg/actuation inhaler Inhale 2 puffs once daily.  4/25/25  Historical Provider, MD   lidocaine (Lidoderm) 5 % patch PRN 5/31/24 4/25/25  Historical Provider, MD     Current  Medications[4]  Imaging  XR knee 4+ views bilateral  Result Date: 4/25/2025  Satisfactory appearance of the tricompartmental left knee arthroplasty without acute bony abnormality or joint effusion.   Chondrocalcinosis of the right knee with osteoarthritis. No fracture or dislocation of right knee. There is however some edema along the anterior aspect of the right knee within the subcutaneous tissues.   Signed by: Ina Parker 4/25/2025 12:45 PM Dictation workstation:   [id number]    XR foot left 3+ views  Result Date: 4/25/2025  Acute intra-articular fracture of the 5th metatarsal base with acute comminuted fractures of the mid and distal shafts of the 4th and 5th metatarsals.   No fracture or dislocation of the left ankle.   Signed by: Ina Parker 4/25/2025 12:42 PM Dictation workstation:   [id number]    XR ankle left 3+ views  Result Date: 4/25/2025  Acute intra-articular fracture of the 5th metatarsal base with acute comminuted fractures of the mid and distal shafts of the 4th and 5th metatarsals.   No fracture or dislocation of the left ankle.   Signed by: Ina Parker 4/25/2025 12:42 PM Dictation workstation:   [id number]      Cardiology, Vascular, and Other Imaging  ECG 12 lead  Result Date: 4/25/2025  Normal sinus rhythm Minimal voltage criteria for LVH, may be normal variant ( R in aVL ) Nonspecific T wave abnormality Abnormal ECG No previous ECGs available      No results found for the last 90 days.       Assessment/Plan   Assessment & Plan  Closed displaced fracture of metatarsal bone, unspecified laterality, unspecified metatarsal, initial encounter  Essential hypertension  Primary insomnia  Anxiety  Depression  Hyperlipidemia  Osteoarthritis  Depression  Anxiety  Hypertriglyceridemia  Peptic ulcer disease    Plan: Continue current medication.  Supportive care begin physical therapy occupational therapy to control pain.  Per ID consult has been obtained.  Patient will undergo surgical intervention.  We  will take DVT, fall, aspiration, decubitus, and DVT precautions.  This has been discussed with the patient and is agreeable to it.      Eitan Maria MD         [1]   Past Medical History:  Diagnosis Date    Dry eye syndrome of bilateral lacrimal glands     Other vitreous opacities, bilateral     Presence of intraocular lens     S/P YAG capsulotomy, bilateral     OD 2007, OS 2022    Unspecified disorder of refraction     Unspecified peripheral retinal degeneration    [2]   Past Surgical History:  Procedure Laterality Date    CATARACT EXTRACTION W/  INTRAOCULAR LENS IMPLANT Right 11/10/2002    +15.5 D    CATARACT EXTRACTION W/  INTRAOCULAR LENS IMPLANT Left 11/09/2017    +13.0 D    HYSTERECTOMY      KNEE ARTHROPLASTY      2022   [3]   Family History  Problem Relation Name Age of Onset    Hypertension Mother      Dementia Mother      Hyperlipidemia Mother      COPD Father      Stroke Son      Hypertension Son     [4]   Current Facility-Administered Medications:     acetaminophen (Tylenol) tablet 1,000 mg, 1,000 mg, oral, q6h PRN, Eitan Maria MD, 1,000 mg at 04/25/25 2217    [START ON 4/26/2025] ALPRAZolam (Xanax) tablet 0.5 mg, 0.5 mg, oral, Daily, Eitan Maria MD    [START ON 4/26/2025] amLODIPine (Norvasc) tablet 10 mg, 10 mg, oral, Daily, Eitan Maria MD    busPIRone (Buspar) tablet 10 mg, 10 mg, oral, BID, Eitan Maria MD, 10 mg at 04/25/25 2214    calcium carbonate (Tums) chewable tablet 500 mg, 1 tablet, oral, Daily PRN, Eitan Maria MD    cetirizine (ZyrTEC) tablet 10 mg, 10 mg, oral, Daily PRN, Eitan Maria MD    ergocalciferol (Vitamin D-2) capsule 1.25 mg, 1.25 mg, oral, Weekly, Eitan Maria MD, 1.25 mg at 04/25/25 2214    [START ON 4/26/2025] fenofibrate (Triglide) tablet 160 mg, 160 mg, oral, Daily, Eitan Maria MD    fluticasone (Flonase) nasal spray 1 spray, 1 spray, Each Nostril, Daily PRN, Eitan Maria MD    gabapentin (Neurontin)  capsule 300 mg, 300 mg, oral, BID PRN, Eitan Maria MD    [START ON 4/26/2025] hydroCHLOROthiazide (HYDRODiuril) tablet 25 mg, 25 mg, oral, q AM, Eitan Maria MD    ibuprofen tablet 400 mg, 400 mg, oral, q6h PRN, Eitan Maria MD    [START ON 4/26/2025] lubiprostone (Amitiza) capsule 8 mcg, 8 mcg, oral, BID, Eitan Maria MD    [START ON 4/26/2025] montelukast (Singulair) tablet 10 mg, 10 mg, oral, Daily before evening meal, Eitan Maria MD    [START ON 4/26/2025] pantoprazole (ProtoNix) EC tablet 20 mg, 20 mg, oral, Daily before breakfast, Eitan Maria MD    pravastatin (Pravachol) tablet 10 mg, 10 mg, oral, Nightly, Eitan Maria MD, 10 mg at 04/25/25 2314    [START ON 4/26/2025] sertraline (Zoloft) tablet 100 mg, 100 mg, oral, Daily, Eitan Maria MD    simethicone (Mylicon) chewable tablet 80 mg, 80 mg, oral, 4x daily PRN, Eitan Maria MD    [START ON 4/26/2025] sucralfate (Carafate) tablet 1 g, 1 g, oral, Daily PRN, Eitan Maria MD    temazepam (Restoril) capsule 15 mg, 15 mg, oral, Nightly PRN, Eitan Maria MD, 15 mg at 04/25/25 9168

## 2025-04-26 NOTE — PROGRESS NOTES
Spiritual Care Visit  Spiritual Care Request    Reason for Visit:  Routine Visit: Introduction     Request Received From:       Focus of Care:  Visited With: Patient         Refer to :          Spiritual Care Assessment    Spiritual Assessment:                      Care Provided:  Intended Effects: Build relationship of care and support, Convey a calming presence, Demonstrate caring and concern    Sense of Community and or Orthodoxy Affiliation:  [employer]   Values/Beliefs  Spiritual Requests During Hospitalization: Nenita asked to be anointed today. and to have Communion too.     Addressed Needs/Concerns and/or Tasneem Through:     Sacramental Encounters  Communion Given Indicator: Yes  Sacrament of Sick-Anointing: Anointed    Outcome:        Advance Directives:         Spiritual Care Annotation    Annotation:  Francine asked to be anointed and tohave Communion today.  Donald Denney

## 2025-04-26 NOTE — CARE PLAN
The patient's goals for the shift include PT/OT    The clinical goals for the shift include remain hemodynamically stable

## 2025-04-26 NOTE — PROGRESS NOTES
Physical Therapy    Physical Therapy Evaluation    Patient Name: Francine Romero  MRN: [id number]  Department: [address]  Room: [address] [place] [address]  Today's Date: 4/26/2025   Time Calculation  Start Time: 0950  Stop Time: 1011  Time Calculation (min): 21 min    Assessment/Plan   PT Assessment  PT Assessment Results: Decreased strength, Decreased mobility, Decreased range of motion, Decreased endurance, Impaired balance, Decreased safety awareness, Impaired hearing, Impaired sensation, Pain  Rehab Prognosis: Good  Barriers to Discharge Home: Caregiver assistance, Physical needs  Caregiver Assistance: Caregiver assistance needed per identified barriers - however, level of patient's required assistance exceeds assistance available at home  Physical Needs: Ambulating household distances limited by function/safety, 24hr mobility assistance needed, High falls risk due to function or environment  Strengths: Support of extended family/friends, Rehab experience, Premorbid level of function, Living arrangement secure, Housing layout, Attitude of self, Access to adaptive/assistive products  Barriers to Participation: Comorbidities  End of Session Communication: Bedside nurse  Assessment Comment: She presented with the above listed impairments (see Assessment Results). Pt required up to moderately increased assist during limited functional mobility compared to her reported baseline of indep. Pt would benefit from continued skilled PT services for maximizing independence and safety prior to & after discharge (MODERATE intensity).  End of Session Patient Position: Bed, 3 rail up, Alarm on (call light and needs within reach)  IP OR SWING BED PT PLAN  Inpatient or Swing Bed: Inpatient  PT Plan  Treatment/Interventions: Bed mobility, Transfer training, Gait training, Strengthening, Therapeutic exercise, Therapeutic activity  PT Plan: Ongoing PT  PT Frequency: 5 times per week  PT Discharge Recommendations: Moderate intensity level of continued  care  PT Recommended Transfer Status: Assist x1, Assistive device  PT - OK to Discharge: Yes    Subjective   General Visit Information:  General  Reason for Referral: Impaired functional mobility due to decreased muscular strength. This 84 year old female was admitted for acute intra-articular fx of L 5th metatarsal base and acute comminuted fxs of mid & distal shafts of L 4th & 5th metatarsals. Surgical repair was tentatively planned for 4/28/25. Podiatry and vascular surgery requested pt be evaluated today.  Past Medical History Relevant to Rehab: anxiety, HTN, L TKA  Missed Visit Reason: Cancel (Order received and chart reviewed. Pt admitted with acute closed displaced fracture of L metatarsal bones 4 and 5. The plan was for surgical repair. She will need a new order and a LLE WB status order post-op.)  Family/Caregiver Present: No  Prior to Session Communication: Bedside nurse  Patient Position Received: Bed, 2 rail up, Alarm off, not on at start of session  General Comment: Cleared by RN for participation. Pt agreed to session and was fully engaged throughout.    Home Living:  Home Living  Type of Home: Apartment  Lives With: Alone  Home Adaptive Equipment: Cane, Walker rolling or standard (FWW)  Home Layout: One level  Home Access: Level entry (elevator to apartment)  Bathroom Shower/Tub: Walk-in shower  Bathroom Equipment: Grab bars in shower  Home Living Comments: Children and grandchildren in area and supportive.    Prior Level of Function:  Prior Function Per Pt/Caregiver Report  ADL Assistance: Independent  Homemaking Assistance: Independent (except had cleaning lady)  Ambulatory Assistance: Independent (PRN cane use. Denied any other falls in the past 6 months.)  Prior Function Comments: (+) driving    Precautions:  Precautions  Hearing/Visual Limitations: bilateral hearing aids (not with her), glasses at all times  LE Weight Bearing Status: Left Non-Weight Bearing  Medical Precautions: Fall  precautions  Precautions Comment: Pt educated in LLE NWB status.       Objective   Pain:  Pain Assessment  0-10 (Numeric) Pain Score: 0 - No pain (After mobility, 2/10 acute pain in L foot. Pt repositioned and resting quietly.)    Cognition:  Cognition  Overall Cognitive Status: Within Functional Limits  Orientation Level: Oriented X4    General Assessments:  General Observation  General Observation: Posterior splint in place at L ankle/foot.    Activity Tolerance  Endurance: Decreased tolerance for upright activites    Sensation  Sensation Comment: Not tested; pt reported paresthesias in bilateral feet which she attributed to spinal stenosis.    ROM  BLE AROM: grossly WFL    Strength  Strength Comments: BLE: grossly >/=3+/5    Coordination  Movements are Fluid and Coordinated: Yes    Postural Control  Postural Control: Within Functional Limits    Static Sitting Balance  Static Sitting-Balance Support: Feet supported  Static Sitting-Level of Assistance: Close supervision  Dynamic Sitting Balance  Dynamic Sitting-Balance Support: Bilateral upper extremity supported (unilateral LE supported)  Dynamic Sitting-Level of Assistance: Close supervision    Static Standing Balance  Static Standing-Balance Support: Bilateral upper extremity supported  Static Standing-Level of Assistance: Contact guard (able to maintain LLE NWB)  Dynamic Standing Balance  Dynamic Standing-Balance Support: Bilateral upper extremity supported  Dynamic Standing-Level of Assistance: Minimum assistance (limited assessment at EOB)    Functional Assessments:  Bed Mobility  Bed Mobility: Yes  Bed Mobility 1  Bed Mobility 1: Supine to sitting, Sitting to supine  Level of Assistance 1: Close supervision (HOB elevated 40°, no rail, toward R side)    Transfers  Transfer: Yes  Transfer 1  Technique 1: Sit to stand  Transfer Device 1: Walker  Transfer Level of Assistance 1: Moderate assistance (assist for lifting torque. Moerate VCs for LLE NWB. x1 trial at  EOB.)  Transfers 2  Technique 2: Stand to sit  Transfer Device 2: Walker  Transfer Level of Assistance 2: Minimum assistance (assist for increased eccentric control. Minimal VCs for walker safety/BUE placement.)    Ambulation/Gait Training  Ambulation/Gait Training Performed: Yes  Ambulation/Gait Training 1  Surface 1: Level tile  Device 1: Rolling walker  Assistance 1: Minimum assistance (assist for steadying at trunk. Moderate VCs for LLE NWB.)  Quality of Gait 1:  (Pt ambualted ~1 ft toward R side at EOB using R fore/rear foot pivoting movements. Slow pacing. No threat for LOB.)     Outcome Measures:  [facility] Basic Mobility  Turning from your back to your side while in a flat bed without using bedrails: A little  Moving from lying on your back to sitting on the side of a flat bed without using bedrails: A little  Moving to and from bed to chair (including a wheelchair): Total  Standing up from a chair using your arms (e.g. wheelchair or bedside chair): A lot  To walk in hospital room: Total  Climbing 3-5 steps with railing: Total  Basic Mobility - Total Score: 11    Encounter Problems       Encounter Problems (Active)       PT Problem       Pt will transition supine<>sit with mod I.       Start:  04/26/25    Expected End:  05/16/25            Pt will transfer sit<>stand with FWW and close S while maintaining LLE NWB.       Start:  04/26/25    Expected End:  05/16/25            Pt will transfer bed<>chair with FWW and close S while maintaining LLE NWB.       Start:  04/26/25    Expected End:  05/16/25            Pt will ambulate >/=5 ft with FWW and CGA while maintaining LLE NWB.       Start:  04/26/25    Expected End:  05/16/25                   Education Documentation  Mobility Training, taught by Trina Lovett PT at 4/26/2025 11:10 AM.  Learner: Patient  Readiness: Acceptance  Method: Explanation  Response: Needs Reinforcement  Comment: Fall precautions, LLE NWB status, PT role, POC & disposition.    Education  Comments  No comments found.

## 2025-04-26 NOTE — NURSING NOTE
Nurse participated in triad rounds with Dr. French and Dr. Kerns. They state surgery will be Monday. Patient in agreement. Asking for PT to see patient. Reached out to Pt.

## 2025-04-26 NOTE — PROGRESS NOTES
Francine Romero is a 84 y.o. female on day 0 of admission presenting with Closed displaced fracture of metatarsal bone, unspecified laterality, unspecified metatarsal, initial encounter.    Patient lives in an apartment, single level, elevator access. She uses a cane when ambulating. She is independent with ADLs, daily tasks, and drives. Her son and daughter are local and involved. PCP is Dr Cabello.   ADOD 04/26/2025  [facility] scores: not yet seen  Patient to have surgery Monday.  If she can go home, she wants [facility]. Family is reviewing SNF list. Likely choices are [facility], [facility], and [facility]. Will confirm choices.   Patient does not have a safe discharge plan. Please speak with care coordinator prior to discharging.     Judi Salas RN

## 2025-04-26 NOTE — PROGRESS NOTES
04/26/25 1643   Discharge Planning   Living Arrangements Alone   Support Systems Children;Family members;Friends/neighbors   Assistance Needed SNF vs HHC   Type of Residence Private residence   Number of Stairs to Enter Residence 0   Number of Stairs Within Residence 0   Do you have animals or pets at home? No   Who is requesting discharge planning? Provider   Home or Post Acute Services Post acute facilities (Rehab/SNF/etc)   Type of Post Acute Facility Services Rehab;Skilled nursing   Expected Discharge Disposition SNF   Does the patient need discharge transport arranged? Yes   RoundTrip coordination needed? Yes   Has discharge transport been arranged? No   Patient Choice   Provider Choice list and CMS website (https://medicare.gov/care-compare#search) for post-acute Quality and Resource Measure Data were provided and reviewed with: Family;Patient   Patient / Family choosing to utilize agency / facility established prior to hospitalization No   Stroke Family Assessment   Stroke Family Assessment Needed No

## 2025-04-26 NOTE — NURSING NOTE
Patient lying in bed comfortably. No needs verbalized. Call light within reach. Bed alarm in use,

## 2025-04-26 NOTE — PROGRESS NOTES
"Reason For Consult  Left foot 4th and 5th metatarsal fractures    History Of Present Illness  Francine Romero is a 84 y.o. female with PMHx listed below. Podiatry consulted for left foot fracture.  Patient moved to a new home recently. She says her current pedal fracture is related to an injury that occurred 3 weeks ago. She twisted and stepped down while trying to kill a bug, and landed on her foot the wrong way. She did not have much pain at the time. She did not have any bruising. She has had swelling. She has taken OTC Tylenol since then. Currently, her pain is a 3 out of 10. She is very hard of hearing and has not worked with physical therapy.       Past Medical History  She has a past medical history of Dry eye syndrome of bilateral lacrimal glands, Other vitreous opacities, bilateral, Presence of intraocular lens, S/P YAG capsulotomy, bilateral, Unspecified disorder of refraction, and Unspecified peripheral retinal degeneration.    Surgical History  She has a past surgical history that includes Hysterectomy; Cataract extraction w/  intraocular lens implant (Right, 11/10/2002); Cataract extraction w/  intraocular lens implant (Left, 11/09/2017); and Knee Arthroplasty.     Social History  She reports that she has quit smoking. Her smoking use included cigarettes. She has never used smokeless tobacco. She reports that she does not currently use alcohol. She reports that she does not use drugs.    Family History  Family History[1]     Allergies  Pregabalin and Eszopiclone      Physical Exam  Reviewed from previous visit splint intact, CFT immediate to digits 1-5 left, light touch intact to digits and able to move digits 1-5 left.      Last Recorded Vitals  Blood pressure 135/68, pulse 58, temperature 36.3 °C (97.3 °F), temperature source Oral, resp. rate 16, height 1.727 m (5' 8\"), weight 86.8 kg (191 lb 5.8 oz), SpO2 96%.      Assessment/Plan     ASSESSMENT AND PLAN     Acute closed displaced fracture of " metatarsal bones 4 and 5, left (initial encounter)  Osteopenia  Peripheral neuropathy 2/2 spinal stenosis     - Patient was evaluated at bedside  - Posterior splint to be continued to LLE.   - NWB to left lower extremity.  - PT ordered as patient will have extended period of NWB post operatively and pre operatively.  - Will add on patient for ORIF of left foot fractures, possibly Monday 4/28/25, awaiting OR availability.  - Above was discussed with patient and daughter, along with postoperative course. They verbalized understanding and agreement.     I spent 45 minutes in the professional and overall care of this patient.      Rachel French, TUYETM         [1]   Family History  Problem Relation Name Age of Onset    Hypertension Mother      Dementia Mother      Hyperlipidemia Mother      COPD Father      Stroke Son      Hypertension Son

## 2025-04-26 NOTE — NURSING NOTE
Patient lying in bed comfortably. Visiting with daughter. Per daughter, anticipate rehab for discharge as patient lives home alone and would not be safe plan to return home right away. Patient daughter states last time se was here, patient was set to go to rehab and then refused to go upon discharge and daughter had to take off two weeks from work to live with patient. Daughter states she cannot provide the support the patient needs right now and it would be best interest of the patient to rehab post op. Provided with list of snf and advised to look into and choose three places for care coordination on Monday.

## 2025-04-26 NOTE — NURSING NOTE
Assumed care of patient. Patient asleep during BSSR. No needs verbalized. Call light within reach. Bed alarm in use.

## 2025-04-26 NOTE — PROGRESS NOTES
04/26/25 1644   [facility] Disability Status   Are you deaf or do you have serious difficulty hearing? N   Are you blind or do you have serious difficulty seeing, even when wearing glasses? N   Because of a physical, mental, or emotional condition, do you have serious difficulty concentrating, remembering, or making decisions? (5 years old or older) N   Do you have serious difficulty walking or climbing stairs? N   Do you have serious difficulty dressing or bathing? N   Because of a physical, mental, or emotional condition, do you have serious difficulty doing errands alone such as visiting the doctor? N        "Subjective   Oksana Pillai is a 37 y.o. female presents for   Chief Complaint   Patient presents with   • Cough     2 weeks       History of Present Illness    Oksana is here with cough, nasal congestion, and drainage for over 2 weeks now.  Her son and mother also have similar symptoms.  Overall, symptoms are getting worse, with nasal discharge becoming for thick and copious and increasing facial pain.  Has tried OTC cold medications without much relief.  No fevers.  Is also complaining of fatigue for several weeks now.  Had lumbar surgery in April and is still on leave from that.  Is doing PT and is improved from before surgery.  She feels like this fatigue is different than her \"usual fatigue\".    The following portions of the patient's history were reviewed and updated as appropriate: allergies, current medications, past family history, past medical history, past social history, past surgical history and problem list.    Review of Systems   Constitutional: Positive for fatigue. Negative for activity change, appetite change, chills and fever.   HENT: Positive for congestion, ear pain (bilateral on and off), postnasal drip, rhinorrhea, sinus pressure and voice change (hoarse). Negative for dental problem, drooling, ear discharge, facial swelling, hearing loss, mouth sores, nosebleeds, sneezing, sore throat, tinnitus and trouble swallowing.    Eyes: Negative.  Negative for photophobia, pain, discharge, redness, itching and visual disturbance.   Respiratory: Positive for cough. Negative for apnea, choking, chest tightness, shortness of breath, wheezing and stridor.    Cardiovascular: Negative.  Negative for chest pain and palpitations.   Gastrointestinal: Negative.    Endocrine: Negative.    Genitourinary: Negative.    Musculoskeletal: Negative.  Negative for arthralgias and myalgias.   Skin: Negative.  Negative for rash.   Allergic/Immunologic: Negative.    Neurological: Positive for headaches. Negative for dizziness and " light-headedness.   Hematological: Negative.    Psychiatric/Behavioral: Negative.    All other systems reviewed and are negative.      Objective   Vitals:    06/13/17 0933   BP: 128/85   Pulse: 84   Resp: 12   Temp: 98.8 °F (37.1 °C)   TempSrc: Oral   SpO2: 99%     Physical Exam   Constitutional: She is oriented to person, place, and time. She appears well-developed and well-nourished. No distress.   HENT:   Head: Normocephalic and atraumatic.   Right Ear: Tympanic membrane, external ear and ear canal normal.   Left Ear: Tympanic membrane, external ear and ear canal normal.   Nose: Mucosal edema present. Right sinus exhibits maxillary sinus tenderness. Left sinus exhibits maxillary sinus tenderness.   Mouth/Throat: Uvula is midline, oropharynx is clear and moist and mucous membranes are normal. No oropharyngeal exudate. No tonsillar exudate.   Eyes: Conjunctivae and lids are normal. Pupils are equal, round, and reactive to light. No scleral icterus.   Neck: Trachea normal and full passive range of motion without pain. Neck supple. No tracheal deviation present. No thyromegaly present.   Cardiovascular: Normal rate, regular rhythm, S1 normal, S2 normal, normal heart sounds and intact distal pulses.   No extrasystoles are present. Exam reveals no gallop, no S3, no S4, no distant heart sounds and no friction rub.    No murmur heard.  Pulmonary/Chest: Effort normal and breath sounds normal. No respiratory distress. She has no decreased breath sounds. She has no wheezes. She has no rhonchi. She has no rales. She exhibits no tenderness.   Abdominal: Normal appearance.   Musculoskeletal: She exhibits no edema or tenderness.   Lymphadenopathy:        Head (right side): Submandibular adenopathy present.        Head (left side): Submandibular adenopathy present.     She has cervical adenopathy (submandibular).   Neurological: She is alert and oriented to person, place, and time.   Skin: Skin is warm and dry. No rash noted. No  erythema.   Psychiatric: She has a normal mood and affect. Her speech is normal and behavior is normal.   Nursing note and vitals reviewed.      Assessment/Plan   Oksana was seen today for cough.    Diagnoses and all orders for this visit:    Acute non-recurrent maxillary sinusitis    Fatigue, unspecified type  -     CBC & Differential  -     Comprehensive Metabolic Panel  -     TSH  -     T4, free  -     Vitamin D 25 Hydroxy  -     Hemoglobin A1c  -     Vitamin B12    Other orders  -     amoxicillin-clavulanate (AUGMENTIN) 875-125 MG per tablet; Take 1 tablet by mouth 2 (Two) Times a Day for 10 days.  -     benzonatate (TESSALON PERLES) 100 MG capsule; 1-2 po TID prn cough

## 2025-04-26 NOTE — ASSESSMENT & PLAN NOTE
Essential hypertension  Primary insomnia  Anxiety  Depression  Hyperlipidemia  Osteoarthritis  Depression  Anxiety  Hypertriglyceridemia  Peptic ulcer disease    Plan: Continue current medication.  Supportive care begin physical therapy occupational therapy to control pain.  Per ID consult has been obtained.  Patient will undergo surgical intervention.  We will take DVT, fall, aspiration, decubitus, and DVT precautions.  This has been discussed with the patient and is agreeable to it.    Date of service: 4/26/2025    Plan: Continue current medication.  Bright input appreciated.  Patient to go for the surgical intervention on 4/28/2025.  Control pain.  We will take DVT, fall, aspiration, decubitus, DVT precaution.  This has been discussed with the patient and is agreeable to it.

## 2025-04-26 NOTE — PROGRESS NOTES
Physical Therapy                 Therapy Communication Note    Patient Name: Francine Romero  MRN: [id number]  Department: [address]  Room: [address] [place] [address]-A  Today's Date: 4/26/2025     Discipline: Physical Therapy    Missed Visit Reason: Cancel (Order received and chart reviewed. Pt admitted with acute closed displaced fracture of L metatarsal bones 4 and 5. The plan was for surgical repair. She will need a new order and a LLE WB status order post-op.)    Missed Time: Cancel

## 2025-04-26 NOTE — NURSING NOTE
Patient took meds as ordered. Changed times for some meds that se takes at home at night. Patient reports MD telling her yesterday OR could still happen today and would like to stay NPO. Nursing checked OR schedule and patient is not on it. Will try to reac out to podiatry as note says OR Monday. Call light within reach.

## 2025-04-26 NOTE — CARE PLAN
The patient's goals for the shift include Try and rest.    The clinical goals for the shift include Manage pain      Problem: Fall/Injury  Goal: Not fall by end of shift  Outcome: Progressing  Goal: Be free from injury by end of the shift  Outcome: Progressing  Goal: Verbalize understanding of personal risk factors for fall in the hospital  Outcome: Progressing  Goal: Verbalize understanding of risk factor reduction measures to prevent injury from fall in the home  Outcome: Progressing  Goal: Use assistive devices by end of the shift  Outcome: Progressing  Goal: Pace activities to prevent fatigue by end of the shift  Outcome: Progressing

## 2025-04-26 NOTE — PROGRESS NOTES
04/26/25 1342   Discharge Planning   Assistance Needed DC plan is SNF   Who is requesting discharge planning? Provider   Home or Post Acute Services Post acute facilities (Rehab/SNF/etc)   Type of Post Acute Facility Services Skilled nursing   Expected Discharge Disposition SNF   Does the patient need discharge transport arranged? Yes   RoundTrip coordination needed? Yes   Has discharge transport been arranged? No     TCR from [facility]катер[facility]cal[facility] requesting SNF choice list.    SNF choice list created and emailed.     1411:  Per bedside RN, dtr was given list.  Plan is surgery on Monday and dtr anticipates rehab.  Requested RN to ask dtr to pick 3 choices and CM will follow up and send referral if PT recommends SNF.

## 2025-04-26 NOTE — PROGRESS NOTES
Francine Romero is a 84 y.o. female on day 0 of admission presenting with Closed displaced fracture of metatarsal bone, unspecified laterality, unspecified metatarsal, initial encounter.    Subjective   Patient was seen and examined for lying in the bed.  Comfortable.  Well-looking in no acute distress.  The patient denied any headache or dizziness.  No nausea vomiting.  Pain is under control.       Objective     Physical Exam  HEENT:  Head externally atraumatic,  extraocular movements intact, oral mucosa moist  Neck:  Supple, no JVP, no palpable adenopathy or thyromegaly.  No carotid bruit.  Chest:  Clear to auscultation and resonant.  Heart:  Regular rate and rhythm, no murmur or gallop could be appreciated.  Abdomen:  Soft, nontender, bowel sounds present, normoactive, no palpable hepatosplenomegaly.  Extremities:  No edema, pulses present, no cyanosis or clubbing.  CNS:  Patient alert, oriented to time, place and person.    No new deficit.  Cranial nerves 2-12 grossly intact  Skin:  No active rash.  Musculoskeletal:  No  apparent joint swelling or erythema, range of movement normal.  Last Recorded Vitals  Heart Rate:  [58-77]   Temp:  [36.3 °C (97.3 °F)-36.8 °C (98.2 °F)]   Resp:  [16-18]   BP: (120-139)/(45-77)   Weight:  [86.8 kg (191 lb 5.8 oz)]   SpO2:  [94 %-98 %]     Intake/Output last 3 Shifts:  I/O last 3 completed shifts:  In: - (0 mL/kg)   Out: 450 (5.2 mL/kg) [Urine:450 (0.1 mL/kg/hr)]  Weight: 86.8 kg     Relevant Results  No results found for the last 90 days.    No results found for this or any previous visit (from the past 24 hours).   Current Medications[1]   Assessment/Plan   Assessment & Plan  Closed displaced fracture of metatarsal bone, unspecified laterality, unspecified metatarsal, initial encounter  Essential hypertension  Primary insomnia  Anxiety  Depression  Hyperlipidemia  Osteoarthritis  Depression  Anxiety  Hypertriglyceridemia  Peptic ulcer disease    Plan: Continue current  medication.  Supportive care begin physical therapy occupational therapy to control pain.  Per ID consult has been obtained.  Patient will undergo surgical intervention.  We will take DVT, fall, aspiration, decubitus, and DVT precautions.  This has been discussed with the patient and is agreeable to it.    Date of service: 4/26/2025    Plan: Continue current medication.  Bright input appreciated.  Patient to go for the surgical intervention on 4/28/2025.  Control pain.  We will take DVT, fall, aspiration, decubitus, DVT precaution.  This has been discussed with the patient and is agreeable to it.           Eitan Maria MD            [1]   Current Facility-Administered Medications:     acetaminophen (Tylenol) tablet 1,000 mg, 1,000 mg, oral, q6h PRN, Eitan Maria MD, 1,000 mg at 04/25/25 2217    ALPRAZolam (Xanax) tablet 0.5 mg, 0.5 mg, oral, Daily, Eitan Maria MD, 0.5 mg at 04/26/25 0620    amLODIPine (Norvasc) tablet 10 mg, 10 mg, oral, Daily, Eitan Maria MD, 10 mg at 04/26/25 0848    busPIRone (Buspar) tablet 10 mg, 10 mg, oral, BID, Eitan Maria MD, 10 mg at 04/26/25 0848    calcium carbonate (Tums) chewable tablet 500 mg, 1 tablet, oral, Daily PRN, Eitan Maria MD    cetirizine (ZyrTEC) tablet 10 mg, 10 mg, oral, Daily PRN, Eitan Maria MD    ergocalciferol (Vitamin D-2) capsule 1.25 mg, 1.25 mg, oral, Weekly, Eitan Maria MD, 1.25 mg at 04/25/25 2214    fenofibrate (Triglide) tablet 160 mg, 160 mg, oral, Daily, Eitan Maria MD    fluticasone (Flonase) nasal spray 1 spray, 1 spray, Each Nostril, Daily PRN, Eitan Maria MD    gabapentin (Neurontin) capsule 300 mg, 300 mg, oral, BID PRN, Eitan Maria MD    hydroCHLOROthiazide (HYDRODiuril) tablet 25 mg, 25 mg, oral, q AM, Eitan Maria MD, 25 mg at 04/26/25 0848    ibuprofen tablet 400 mg, 400 mg, oral, q6h PRN, Eitan Maria MD    lubiprostone (Amitiza) capsule 8 mcg, 8  mcg, oral, BID, Eitan Maria MD    montelukast (Singulair) tablet 10 mg, 10 mg, oral, Daily before evening meal, Eitan Maria MD    pantoprazole (ProtoNix) EC tablet 20 mg, 20 mg, oral, Daily before breakfast, Eitan Maria MD, 20 mg at 04/26/25 0620    pravastatin (Pravachol) tablet 10 mg, 10 mg, oral, Nightly, Eitan Maria MD, 10 mg at 04/25/25 2314    sertraline (Zoloft) tablet 100 mg, 100 mg, oral, Daily, Eitan Maria MD    simethicone (Mylicon) chewable tablet 80 mg, 80 mg, oral, 4x daily PRN, Eitan Maria MD, 80 mg at 04/26/25 1355    sucralfate (Carafate) tablet 1 g, 1 g, oral, Daily PRN, Eitan Maria MD    temazepam (Restoril) capsule 15 mg, 15 mg, oral, Nightly PRN, Eitan Maria MD, 15 mg at 04/25/25 8916

## 2025-04-26 NOTE — PROGRESS NOTES
04/26/25 1641   Physical Activity   On average, how many days per week do you engage in moderate to strenuous exercise (like a brisk walk)? 7 days   On average, how many minutes do you engage in exercise at this level? 10 min   Financial Resource Strain   How hard is it for you to pay for the very basics like food, housing, medical care, and heating? Not hard   Housing Stability   In the last 12 months, was there a time when you were not able to pay the mortgage or rent on time? N   In the past 12 months, how many times have you moved where you were living? 1   At any time in the past 12 months, were you homeless or living in a shelter (including now)? N   Transportation Needs   In the past 12 months, has lack of transportation kept you from medical appointments or from getting medications? no   In the past 12 months, has lack of transportation kept you from meetings, work, or from getting things needed for daily living? No   Food Insecurity   Within the past 12 months, you worried that your food would run out before you got the money to buy more. Never true   Within the past 12 months, the food you bought just didn't last and you didn't have money to get more. Never true   Stress   Do you feel stress - tense, restless, nervous, or anxious, or unable to sleep at night because your mind is troubled all the time - these days? Only a littl   Social Connections   In a typical week, how many times do you talk on the phone with family, friends, or neighbors? More than 3   How often do you get together with friends or relatives? More than 3   How often do you attend Voodoo or [employer] services? More than 4   Do you belong to any clubs or organizations such as Voodoo groups, unions, fraternal or athletic groups, or school groups? No   How often do you attend meetings of the clubs or organizations you belong to? Never   Are you , , , , never , or living with a partner?     Intimate Partner Violence   Within the last year, have you been afraid of your partner or ex-partner? No   Within the last year, have you been humiliated or emotionally abused in other ways by your partner or ex-partner? No   Within the last year, have you been kicked, hit, slapped, or otherwise physically hurt by your partner or ex-partner? No   Within the last year, have you been raped or forced to have any kind of sexual activity by your partner or ex-partner? No   Alcohol Use   Q1: How often do you have a drink containing alcohol? Never   Q2: How many drinks containing alcohol do you have on a typical day when you are drinking? None   Q3: How often do you have six or more drinks on one occasion? Never   Utilities   In the past 12 months has the electric, gas, oil, or water company threatened to shut off services in your home? No   Health Literacy   How often do you need to have someone help you when you read instructions, pamphlets, or other written material from your doctor or pharmacy? Often   Follow-Ups   We make community resources available to all of our patients to assist with everyday needs. We may be able to connect you with those resources. Would you be interested? N

## 2025-04-26 NOTE — ASSESSMENT & PLAN NOTE
Essential hypertension  Primary insomnia  Anxiety  Depression  Hyperlipidemia  Osteoarthritis  Depression  Anxiety  Hypertriglyceridemia  Peptic ulcer disease    Plan: Continue current medication.  Supportive care begin physical therapy occupational therapy to control pain.  Per ID consult has been obtained.  Patient will undergo surgical intervention.  We will take DVT, fall, aspiration, decubitus, and DVT precautions.  This has been discussed with the patient and is agreeable to it.

## 2025-04-27 PROCEDURE — 1200000002 HC GENERAL ROOM WITH TELEMETRY DAILY

## 2025-04-27 PROCEDURE — 2500000001 HC RX 250 WO HCPCS SELF ADMINISTERED DRUGS (ALT 637 FOR MEDICARE OP): Performed by: INTERNAL MEDICINE

## 2025-04-27 PROCEDURE — 2500000002 HC RX 250 W HCPCS SELF ADMINISTERED DRUGS (ALT 637 FOR MEDICARE OP, ALT 636 FOR OP/ED): Performed by: INTERNAL MEDICINE

## 2025-04-27 PROCEDURE — 2500000004 HC RX 250 GENERAL PHARMACY W/ HCPCS (ALT 636 FOR OP/ED): Performed by: INTERNAL MEDICINE

## 2025-04-27 RX ADMIN — MONTELUKAST 10 MG: 10 TABLET, FILM COATED ORAL at 20:40

## 2025-04-27 RX ADMIN — PANTOPRAZOLE SODIUM 20 MG: 20 TABLET, DELAYED RELEASE ORAL at 05:46

## 2025-04-27 RX ADMIN — ALPRAZOLAM 0.5 MG: 0.5 TABLET ORAL at 05:46

## 2025-04-27 RX ADMIN — SERTRALINE 100 MG: 100 TABLET, FILM COATED ORAL at 20:39

## 2025-04-27 RX ADMIN — ACETAMINOPHEN 1000 MG: 500 TABLET ORAL at 22:25

## 2025-04-27 RX ADMIN — HYDROCHLOROTHIAZIDE 25 MG: 25 TABLET ORAL at 10:25

## 2025-04-27 RX ADMIN — BUSPIRONE HYDROCHLORIDE 10 MG: 10 TABLET ORAL at 20:40

## 2025-04-27 RX ADMIN — PRAVASTATIN SODIUM 10 MG: 10 TABLET ORAL at 20:40

## 2025-04-27 RX ADMIN — HEPARIN SODIUM 5000 UNITS: 5000 INJECTION INTRAVENOUS; SUBCUTANEOUS at 20:40

## 2025-04-27 RX ADMIN — AMLODIPINE BESYLATE 10 MG: 10 TABLET ORAL at 10:25

## 2025-04-27 RX ADMIN — HEPARIN SODIUM 5000 UNITS: 5000 INJECTION INTRAVENOUS; SUBCUTANEOUS at 02:41

## 2025-04-27 RX ADMIN — SIMETHICONE 80 MG: 80 TABLET, CHEWABLE ORAL at 15:44

## 2025-04-27 RX ADMIN — TEMAZEPAM 15 MG: 15 CAPSULE ORAL at 20:39

## 2025-04-27 RX ADMIN — TEMAZEPAM 15 MG: 15 CAPSULE ORAL at 02:41

## 2025-04-27 RX ADMIN — BUSPIRONE HYDROCHLORIDE 10 MG: 10 TABLET ORAL at 10:25

## 2025-04-27 RX ADMIN — HEPARIN SODIUM 5000 UNITS: 5000 INJECTION INTRAVENOUS; SUBCUTANEOUS at 12:08

## 2025-04-27 RX ADMIN — FENOFIBRATE 160 MG: 160 TABLET ORAL at 20:39

## 2025-04-27 RX ADMIN — ACETAMINOPHEN 1000 MG: 500 TABLET ORAL at 02:41

## 2025-04-27 ASSESSMENT — COGNITIVE AND FUNCTIONAL STATUS - GENERAL
WALKING IN HOSPITAL ROOM: A LOT
DRESSING REGULAR LOWER BODY CLOTHING: A LITTLE
HELP NEEDED FOR BATHING: A LITTLE
MOVING TO AND FROM BED TO CHAIR: A LITTLE
MOBILITY SCORE: 18
CLIMB 3 TO 5 STEPS WITH RAILING: A LOT
TOILETING: A LITTLE
DRESSING REGULAR LOWER BODY CLOTHING: A LITTLE
WALKING IN HOSPITAL ROOM: A LOT
DAILY ACTIVITIY SCORE: 20
STANDING UP FROM CHAIR USING ARMS: A LITTLE
DRESSING REGULAR UPPER BODY CLOTHING: A LITTLE
DRESSING REGULAR UPPER BODY CLOTHING: A LITTLE
DAILY ACTIVITIY SCORE: 20
MOVING TO AND FROM BED TO CHAIR: A LITTLE
TOILETING: A LITTLE
STANDING UP FROM CHAIR USING ARMS: A LOT
CLIMB 3 TO 5 STEPS WITH RAILING: A LOT
HELP NEEDED FOR BATHING: A LITTLE
MOBILITY SCORE: 17

## 2025-04-27 ASSESSMENT — PAIN SCALES - GENERAL
PAINLEVEL_OUTOF10: 3
PAINLEVEL_OUTOF10: 0 - NO PAIN

## 2025-04-27 ASSESSMENT — PAIN SCALES - WONG BAKER: WONGBAKER_NUMERICALRESPONSE: NO HURT

## 2025-04-27 ASSESSMENT — PAIN DESCRIPTION - ORIENTATION: ORIENTATION: LEFT

## 2025-04-27 ASSESSMENT — PAIN - FUNCTIONAL ASSESSMENT
PAIN_FUNCTIONAL_ASSESSMENT: FLACC (FACE, LEGS, ACTIVITY, CRY, CONSOLABILITY)
PAIN_FUNCTIONAL_ASSESSMENT: FLACC (FACE, LEGS, ACTIVITY, CRY, CONSOLABILITY)
PAIN_FUNCTIONAL_ASSESSMENT: 0-10

## 2025-04-27 ASSESSMENT — PAIN DESCRIPTION - DESCRIPTORS: DESCRIPTORS: ACHING

## 2025-04-27 ASSESSMENT — PAIN DESCRIPTION - LOCATION: LOCATION: FOOT

## 2025-04-27 NOTE — NURSING NOTE
This RN spoke to Dr. Maria, Dr. Maria reported okay to give next dose of heparin but morning heparin should NOT be give due to surgery.  This Rn also notified him that patient does not have lab orders for today.

## 2025-04-27 NOTE — NURSING NOTE
Pt resting in bed.No complaints. Pt is to be NPO after MN for OR tomorrow. Pt is aware of POC. PCA changing Pt incontinence pad and will place pure wick in place. Call light in reach.

## 2025-04-27 NOTE — CARE PLAN
The patient's goals for the shift include Try and rest.    The clinical goals for the shift include Stable VS/labs    Over the shift, the patient did not make progress toward the following goals. Barriers to progression include injury. Recommendations to address these barriers include rest and comfort.

## 2025-04-27 NOTE — NURSING NOTE
Bed side shift report received. Patient resting comfortably. Patient appears to be asleep. Breathing even and unlabored. Patient's left foot/ankle in splinted soft cast. Call light within reach. This nurse assumed care. Bed alarm active. Bed in lowest position.

## 2025-04-27 NOTE — NURSING NOTE
End of shift, bedside shift report given. Patient laying in bed resting comfortably, with call light within reach. Left foot remains in splint. Patient verbalizes no new concerns at this time. Bed alarm active. Bed at lowest position.

## 2025-04-27 NOTE — PROGRESS NOTES
"Francine Romero is a 84 y.o. female on day 1 of admission presenting with Closed displaced fracture of metatarsal bone, unspecified laterality, unspecified metatarsal, initial encounter.    Subjective   Pt was seen resting comfortably in bed and NAD. Pt notes minor pain to the left foot however is well managed with pain medication. Patient sustained a left fourth and fifth metatarsal fracture about three weeks ago. Pt has no other pedal complaints and denies constitutional symptoms.        Physical Exam    Objective     REVIEW OF SYSTEMS  A 10 point review of systems examination was performed and otherwise negative unless indicated in the subjective portion of the note.  .    Constitutional: AOx3, NAD  Head/Neck: NCAT  Respiratory: Breathing comfortably on room air  Eyes: Clear sclera  Psychological: Appropriate mood and behavior    Lower Extremity Focused Exam:  Vasc:   DP and PT pulses are palpable b/l. CFT brisk to hallux.   Skin temperature is warm to warm from proximal to distal.   Edema diffuse of the left forefoot.  Light bluish discoloration of the dorsal foot.     Neuro:   Light touch intact to LLE.      MSK:   Tender with palpation over the dorsolateral fjorefoot especially over fourth and fifth distal rays. Foot is neutral relative to the leg 2/2 posterior splint     Derm:   Edema diffuse of the foot. Ecchymosis of the dorsal foot, in line with fractured metatarsal areas.  Toenails 1-5 intact.  No wounds.  Skin is supple    Last Recorded Vitals  Blood pressure 132/50, pulse 61, temperature 36.2 °C (97.2 °F), temperature source Axillary, resp. rate 18, height 1.727 m (5' 8\"), weight 86.7 kg (191 lb 2.2 oz), SpO2 96%.    Intake/Output last 3 Shifts:  I/O last 3 completed shifts:  In: 620 (7.2 mL/kg) [P.O.:620]  Out: 2500 (28.8 mL/kg) [Urine:2500 (0.8 mL/kg/hr)]  Weight: 86.7 kg     Relevant Results           Assessment & Plan  Closed displaced fracture of metatarsal bone, unspecified laterality, unspecified " metatarsal, initial encounter    Acute closed displaced fracture of metatarsal bones 4 and 5, left (initial encounter)  Osteopenia  Peripheral neuropathy 2/2 spinal stenosis    Plan:  - Patient was seen at bedside and is resting comfortably.  A total of 55 minutes of face-to-face time was spent on this patient for professional services including but not limited to obtaining medially appropriate history, performing physical examination, collecting and explaining pertinent findings to patient, discussing relevant and viable treatment options, making appropriate level of medical decision, and coordinating care and facilitating treatment.  The end of the encounter was spent educating patient and family on treatment plan. All questions and concerns were answered and addressed to the pt's apparent satisfaction.  - Patient was evaluated at bedside  - Posterior splint to be continued to LLE.   - NWB to left lower extremity.  - PT ordered as patient will have extended period of NWB post operatively and pre operatively.  - Will add on patient for ORIF of left foot fractures, possibly Monday 4/28/25, awaiting OR availability.  - NPO after midnight ordered         Jim Kerns DPM (Surgical Fellow)  Podiatric Medicine & Surgery  Please Haiku message me with any questions or concerns.            Jim Kerns DPM

## 2025-04-27 NOTE — CARE PLAN
The patient's goals for the shift include Try and rest.    The clinical goals for the shift include safety, stable vitals &labs

## 2025-04-27 NOTE — ASSESSMENT & PLAN NOTE
Essential hypertension  Primary insomnia  Anxiety  Depression  Hyperlipidemia  Osteoarthritis  Depression  Anxiety  Hypertriglyceridemia  Peptic ulcer disease    Plan: Continue current medication.  Supportive care begin physical therapy occupational therapy to control pain.  Per ID consult has been obtained.  Patient will undergo surgical intervention.  We will take DVT, fall, aspiration, decubitus, and DVT precautions.  This has been discussed with the patient and is agreeable to it.    Date of service: 4/26/2025    Plan: Continue current medication.  Bright input appreciated.  Patient to go for the surgical intervention on 4/28/2025.  Control pain.  We will take DVT, fall, aspiration, decubitus, DVT precaution.  This has been discussed with the patient and is agreeable to it.    Date of service: 4/27/2025    Plan: Continue current medication.  Patient is complaining of some pain in the foot.  Otherwise stable.  Patient is also complaining about heparin.  Hold heparin after tonight dose.  Patient can be started on the oral anticoagulants for DVT prophylaxis after the surgery.  Lab work reviewed.  We will treat DVT, fall, aspiration, decubitus, and DVT precaution.  This has been discussed with the patient and she is agreeable to it.

## 2025-04-27 NOTE — PROGRESS NOTES
Francine Romero is a 84 y.o. female on day 1 of admission presenting with Closed displaced fracture of metatarsal bone, unspecified laterality, unspecified metatarsal, initial encounter.    Subjective   Patient was seen and examined lying in the bed.  Comfortable.  Did not look in acute distress.  Denies any headache or dizziness.       Objective     Physical Exam  HEENT:  Head externally atraumatic,  extraocular movements intact, oral mucosa moist  Neck:  Supple, no JVP, no palpable adenopathy or thyromegaly.  No carotid bruit.  Chest:  Clear to auscultation and resonant.  Heart:  Regular rate and rhythm, no murmur or gallop could be appreciated.  Abdomen:  Soft, nontender, bowel sounds present, normoactive, no palpable hepatosplenomegaly.  Extremities:  No edema, pulses present, no cyanosis or clubbing.  Has a cast on the left foot.  CNS:  Patient alert, oriented to time, place and person.    No new deficit.  Cranial nerves 2-12 grossly intact  Skin:  No active rash.  Musculoskeletal:  No  apparent joint swelling or erythema, range of movement normal.  Last Recorded Vitals  Heart Rate:  [61-91]   Temp:  [36.2 °C (97.2 °F)-36.8 °C (98.2 °F)]   Resp:  [16-18]   BP: (121-157)/(50-66)   Weight:  [86.7 kg (191 lb 2.2 oz)]   SpO2:  [94 %-98 %]     Intake/Output last 3 Shifts:  I/O last 3 completed shifts:  In: 620 (7.2 mL/kg) [P.O.:620]  Out: 2500 (28.8 mL/kg) [Urine:2500 (0.8 mL/kg/hr)]  Weight: 86.7 kg     Relevant Results  No results found for the last 90 days.    No results found for this or any previous visit (from the past 24 hours).   Current Medications[1]   Assessment/Plan   Assessment & Plan  Closed displaced fracture of metatarsal bone, unspecified laterality, unspecified metatarsal, initial encounter  Essential hypertension  Primary insomnia  Anxiety  Depression  Hyperlipidemia  Osteoarthritis  Depression  Anxiety  Hypertriglyceridemia  Peptic ulcer disease    Plan: Continue current medication.  Supportive  care begin physical therapy occupational therapy to control pain.  Per ID consult has been obtained.  Patient will undergo surgical intervention.  We will take DVT, fall, aspiration, decubitus, and DVT precautions.  This has been discussed with the patient and is agreeable to it.    Date of service: 4/26/2025    Plan: Continue current medication.  Bright input appreciated.  Patient to go for the surgical intervention on 4/28/2025.  Control pain.  We will take DVT, fall, aspiration, decubitus, DVT precaution.  This has been discussed with the patient and is agreeable to it.    Date of service: 4/27/2025    Plan: Continue current medication.  Patient is complaining of some pain in the foot.  Otherwise stable.  Patient is also complaining about heparin.  Hold heparin after tonight dose.  Patient can be started on the oral anticoagulants for DVT prophylaxis after the surgery.  Lab work reviewed.  We will treat DVT, fall, aspiration, decubitus, and DVT precaution.  This has been discussed with the patient and she is agreeable to it.           Eitan Maria MD           [1]   Current Facility-Administered Medications:     acetaminophen (Tylenol) tablet 1,000 mg, 1,000 mg, oral, q6h PRN, Eitan Maria MD, 1,000 mg at 04/27/25 0241    ALPRAZolam (Xanax) tablet 0.5 mg, 0.5 mg, oral, Daily, Eitan Maria MD, 0.5 mg at 04/27/25 0546    amLODIPine (Norvasc) tablet 10 mg, 10 mg, oral, Daily, Eitan Maria MD, 10 mg at 04/27/25 1025    busPIRone (Buspar) tablet 10 mg, 10 mg, oral, BID, Eitan Maria MD, 10 mg at 04/27/25 1025    calcium carbonate (Tums) chewable tablet 500 mg, 1 tablet, oral, Daily PRN, Eitan Maria MD    cetirizine (ZyrTEC) tablet 10 mg, 10 mg, oral, Daily PRN, Eitan Maria MD    ergocalciferol (Vitamin D-2) capsule 1.25 mg, 1.25 mg, oral, Weekly, Eitan Maria MD, 1.25 mg at 04/25/25 2214    fenofibrate (Triglide) tablet 160 mg, 160 mg, oral, Daily, Eitan [place]O  MD Candace, 160 mg at 04/26/25 2036    fluticasone (Flonase) nasal spray 1 spray, 1 spray, Each Nostril, Daily PRN, Eitan Maria MD    gabapentin (Neurontin) capsule 300 mg, 300 mg, oral, BID PRN, Eitan Maria MD    heparin (porcine) injection 5,000 Units, 5,000 Units, subcutaneous, q8h, Eitan Maria MD, 5,000 Units at 04/27/25 1208    hydroCHLOROthiazide (HYDRODiuril) tablet 25 mg, 25 mg, oral, q AM, Eitan Maria MD, 25 mg at 04/27/25 1025    ibuprofen tablet 400 mg, 400 mg, oral, q6h PRN, Eitan Maria MD    lubiprostone (Amitiza) capsule 8 mcg, 8 mcg, oral, BID, Eitan Maria MD    montelukast (Singulair) tablet 10 mg, 10 mg, oral, Daily before evening meal, Eitan Maria MD, 10 mg at 04/26/25 2036    pantoprazole (ProtoNix) EC tablet 20 mg, 20 mg, oral, Daily before breakfast, Eitan Maria MD, 20 mg at 04/27/25 0546    pravastatin (Pravachol) tablet 10 mg, 10 mg, oral, Nightly, Eitan Maria MD, 10 mg at 04/26/25 2036    sertraline (Zoloft) tablet 100 mg, 100 mg, oral, Daily, Eitan Maria MD, 100 mg at 04/26/25 2036    simethicone (Mylicon) chewable tablet 80 mg, 80 mg, oral, 4x daily PRN, Eitan Maria MD, 80 mg at 04/26/25 1355    sucralfate (Carafate) tablet 1 g, 1 g, oral, Daily PRN, Eitan Maria MD    temazepam (Restoril) capsule 15 mg, 15 mg, oral, Nightly PRN, Eitan Maria MD, 15 mg at 04/27/25 0241

## 2025-04-28 ENCOUNTER — ANESTHESIA (OUTPATIENT)
Dept: OPERATING ROOM | Facility: HOSPITAL | Age: 85
DRG: 505 | End: 2025-04-28
Payer: MEDICARE

## 2025-04-28 ENCOUNTER — APPOINTMENT (OUTPATIENT)
Dept: RADIOLOGY | Facility: HOSPITAL | Age: 85
DRG: 505 | End: 2025-04-28
Payer: MEDICARE

## 2025-04-28 ENCOUNTER — APPOINTMENT (OUTPATIENT)
Dept: CARDIOLOGY | Facility: HOSPITAL | Age: 85
DRG: 505 | End: 2025-04-28
Payer: MEDICARE

## 2025-04-28 ENCOUNTER — ANESTHESIA EVENT (OUTPATIENT)
Dept: OPERATING ROOM | Facility: HOSPITAL | Age: 85
DRG: 505 | End: 2025-04-28
Payer: MEDICARE

## 2025-04-28 PROBLEM — K27.9 PUD (PEPTIC ULCER DISEASE): Status: ACTIVE | Noted: 2025-04-28

## 2025-04-28 PROBLEM — F32.A DEPRESSION: Status: ACTIVE | Noted: 2025-04-28

## 2025-04-28 PROBLEM — I10 HTN (HYPERTENSION): Status: ACTIVE | Noted: 2025-04-28

## 2025-04-28 LAB
ATRIAL RATE: 72 BPM
ATRIAL RATE: 86 BPM
P AXIS: 21 DEGREES
P AXIS: 51 DEGREES
P OFFSET: 202 MS
P OFFSET: 204 MS
P ONSET: 145 MS
P ONSET: 158 MS
PR INTERVAL: 138 MS
PR INTERVAL: 166 MS
Q ONSET: 227 MS
Q ONSET: 228 MS
QRS COUNT: 12 BEATS
QRS COUNT: 14 BEATS
QRS DURATION: 84 MS
QRS DURATION: 84 MS
QT INTERVAL: 288 MS
QT INTERVAL: 344 MS
QTC CALCULATION(BAZETT): 315 MS
QTC CALCULATION(BAZETT): 411 MS
QTC FREDERICIA: 306 MS
QTC FREDERICIA: 388 MS
R AXIS: -2 DEGREES
R AXIS: -3 DEGREES
T AXIS: 41 DEGREES
T AXIS: 57 DEGREES
T OFFSET: 371 MS
T OFFSET: 400 MS
VENTRICULAR RATE: 72 BPM
VENTRICULAR RATE: 86 BPM

## 2025-04-28 PROCEDURE — 3700000002 HC GENERAL ANESTHESIA TIME - EACH INCREMENTAL 1 MINUTE: Performed by: PODIATRIST

## 2025-04-28 PROCEDURE — 2500000004 HC RX 250 GENERAL PHARMACY W/ HCPCS (ALT 636 FOR OP/ED): Performed by: PODIATRIST

## 2025-04-28 PROCEDURE — 2500000004 HC RX 250 GENERAL PHARMACY W/ HCPCS (ALT 636 FOR OP/ED): Mod: JZ | Performed by: STUDENT IN AN ORGANIZED HEALTH CARE EDUCATION/TRAINING PROGRAM

## 2025-04-28 PROCEDURE — 7100000001 HC RECOVERY ROOM TIME - INITIAL BASE CHARGE: Performed by: PODIATRIST

## 2025-04-28 PROCEDURE — 2500000004 HC RX 250 GENERAL PHARMACY W/ HCPCS (ALT 636 FOR OP/ED): Mod: JZ | Performed by: NURSE PRACTITIONER

## 2025-04-28 PROCEDURE — 97110 THERAPEUTIC EXERCISES: CPT | Mod: GP

## 2025-04-28 PROCEDURE — 2500000001 HC RX 250 WO HCPCS SELF ADMINISTERED DRUGS (ALT 637 FOR MEDICARE OP): Performed by: INTERNAL MEDICINE

## 2025-04-28 PROCEDURE — 99100 ANES PT EXTEME AGE<1 YR&>70: CPT | Performed by: STUDENT IN AN ORGANIZED HEALTH CARE EDUCATION/TRAINING PROGRAM

## 2025-04-28 PROCEDURE — 1200000002 HC GENERAL ROOM WITH TELEMETRY DAILY

## 2025-04-28 PROCEDURE — 2780000003 HC OR 278 NO HCPCS: Performed by: PODIATRIST

## 2025-04-28 PROCEDURE — 2500000001 HC RX 250 WO HCPCS SELF ADMINISTERED DRUGS (ALT 637 FOR MEDICARE OP): Performed by: STUDENT IN AN ORGANIZED HEALTH CARE EDUCATION/TRAINING PROGRAM

## 2025-04-28 PROCEDURE — 3600000009 HC OR TIME - EACH INCREMENTAL 1 MINUTE - PROCEDURE LEVEL FOUR: Performed by: PODIATRIST

## 2025-04-28 PROCEDURE — C1713 ANCHOR/SCREW BN/BN,TIS/BN: HCPCS | Performed by: PODIATRIST

## 2025-04-28 PROCEDURE — 76000 FLUOROSCOPY <1 HR PHYS/QHP: CPT

## 2025-04-28 PROCEDURE — A28485 PR OPEN TREATMENT METATARSAL FRACTURE EACH: Performed by: NURSE ANESTHETIST, CERTIFIED REGISTERED

## 2025-04-28 PROCEDURE — A28485 PR OPEN TREATMENT METATARSAL FRACTURE EACH: Performed by: STUDENT IN AN ORGANIZED HEALTH CARE EDUCATION/TRAINING PROGRAM

## 2025-04-28 PROCEDURE — 2720000007 HC OR 272 NO HCPCS: Performed by: PODIATRIST

## 2025-04-28 PROCEDURE — 2500000004 HC RX 250 GENERAL PHARMACY W/ HCPCS (ALT 636 FOR OP/ED): Mod: JZ | Performed by: NURSE ANESTHETIST, CERTIFIED REGISTERED

## 2025-04-28 PROCEDURE — 8E0YXBF COMPUTER ASSISTED PROCEDURE OF LOWER EXTREMITY, WITH FLUOROSCOPY: ICD-10-PCS | Performed by: STUDENT IN AN ORGANIZED HEALTH CARE EDUCATION/TRAINING PROGRAM

## 2025-04-28 PROCEDURE — 2500000002 HC RX 250 W HCPCS SELF ADMINISTERED DRUGS (ALT 637 FOR MEDICARE OP, ALT 636 FOR OP/ED): Performed by: INTERNAL MEDICINE

## 2025-04-28 PROCEDURE — 2500000004 HC RX 250 GENERAL PHARMACY W/ HCPCS (ALT 636 FOR OP/ED): Performed by: STUDENT IN AN ORGANIZED HEALTH CARE EDUCATION/TRAINING PROGRAM

## 2025-04-28 PROCEDURE — 93005 ELECTROCARDIOGRAM TRACING: CPT

## 2025-04-28 PROCEDURE — 93010 ELECTROCARDIOGRAM REPORT: CPT | Performed by: INTERNAL MEDICINE

## 2025-04-28 PROCEDURE — 2500000005 HC RX 250 GENERAL PHARMACY W/O HCPCS: Performed by: NURSE ANESTHETIST, CERTIFIED REGISTERED

## 2025-04-28 PROCEDURE — 2500000004 HC RX 250 GENERAL PHARMACY W/ HCPCS (ALT 636 FOR OP/ED): Performed by: INTERNAL MEDICINE

## 2025-04-28 PROCEDURE — 7100000002 HC RECOVERY ROOM TIME - EACH INCREMENTAL 1 MINUTE: Performed by: PODIATRIST

## 2025-04-28 PROCEDURE — 3700000001 HC GENERAL ANESTHESIA TIME - INITIAL BASE CHARGE: Performed by: PODIATRIST

## 2025-04-28 PROCEDURE — 3600000004 HC OR TIME - INITIAL BASE CHARGE - PROCEDURE LEVEL FOUR: Performed by: PODIATRIST

## 2025-04-28 PROCEDURE — 0QSP04Z REPOSITION LEFT METATARSAL WITH INTERNAL FIXATION DEVICE, OPEN APPROACH: ICD-10-PCS | Performed by: STUDENT IN AN ORGANIZED HEALTH CARE EDUCATION/TRAINING PROGRAM

## 2025-04-28 PROCEDURE — 97530 THERAPEUTIC ACTIVITIES: CPT | Mod: GP

## 2025-04-28 DEVICE — IMPLANTABLE DEVICE: Type: IMPLANTABLE DEVICE | Site: FOOT | Status: FUNCTIONAL

## 2025-04-28 DEVICE — IMPLANTABLE DEVICE: Type: IMPLANTABLE DEVICE | Site: FOOT | Status: NON-FUNCTIONAL

## 2025-04-28 DEVICE — SCREW, LOCKING, T6, 2.0 X 14MM: Type: IMPLANTABLE DEVICE | Site: FOOT | Status: FUNCTIONAL

## 2025-04-28 RX ORDER — FENTANYL CITRATE 50 UG/ML
INJECTION, SOLUTION INTRAMUSCULAR; INTRAVENOUS AS NEEDED
Status: DISCONTINUED | OUTPATIENT
Start: 2025-04-28 | End: 2025-04-28

## 2025-04-28 RX ORDER — ONDANSETRON HYDROCHLORIDE 2 MG/ML
4 INJECTION, SOLUTION INTRAVENOUS ONCE AS NEEDED
Status: DISCONTINUED | OUTPATIENT
Start: 2025-04-28 | End: 2025-04-29 | Stop reason: HOSPADM

## 2025-04-28 RX ORDER — HYDROMORPHONE HYDROCHLORIDE 0.2 MG/ML
0.2 INJECTION INTRAMUSCULAR; INTRAVENOUS; SUBCUTANEOUS EVERY 5 MIN PRN
Status: ACTIVE | OUTPATIENT
Start: 2025-04-28 | End: 2025-04-28

## 2025-04-28 RX ORDER — OXYCODONE HYDROCHLORIDE 5 MG/1
5 TABLET ORAL EVERY 4 HOURS PRN
Refills: 0 | Status: ACTIVE | OUTPATIENT
Start: 2025-04-28 | End: 2025-04-28

## 2025-04-28 RX ORDER — LIDOCAINE HYDROCHLORIDE 10 MG/ML
INJECTION, SOLUTION INFILTRATION; PERINEURAL AS NEEDED
Status: DISCONTINUED | OUTPATIENT
Start: 2025-04-28 | End: 2025-04-28 | Stop reason: HOSPADM

## 2025-04-28 RX ORDER — PHENYLEPHRINE HCL IN 0.9% NACL 1 MG/10 ML
SYRINGE (ML) INTRAVENOUS AS NEEDED
Status: DISCONTINUED | OUTPATIENT
Start: 2025-04-28 | End: 2025-04-28

## 2025-04-28 RX ORDER — ACETAMINOPHEN 325 MG/1
650 TABLET ORAL EVERY 4 HOURS PRN
Status: ACTIVE | OUTPATIENT
Start: 2025-04-28 | End: 2025-04-28

## 2025-04-28 RX ORDER — CEFAZOLIN SODIUM 2 G/50ML
2 SOLUTION INTRAVENOUS ONCE
Status: COMPLETED | OUTPATIENT
Start: 2025-04-28 | End: 2025-04-28

## 2025-04-28 RX ORDER — ALBUTEROL SULFATE 0.83 MG/ML
2.5 SOLUTION RESPIRATORY (INHALATION) ONCE AS NEEDED
Status: CANCELLED | OUTPATIENT
Start: 2025-04-28

## 2025-04-28 RX ORDER — LIDOCAINE HYDROCHLORIDE 20 MG/ML
INJECTION, SOLUTION INFILTRATION; PERINEURAL AS NEEDED
Status: DISCONTINUED | OUTPATIENT
Start: 2025-04-28 | End: 2025-04-28

## 2025-04-28 RX ORDER — ALBUTEROL SULFATE 0.83 MG/ML
2.5 SOLUTION RESPIRATORY (INHALATION) ONCE AS NEEDED
Status: DISCONTINUED | OUTPATIENT
Start: 2025-04-28 | End: 2025-04-29 | Stop reason: HOSPADM

## 2025-04-28 RX ORDER — ONDANSETRON HYDROCHLORIDE 2 MG/ML
4 INJECTION, SOLUTION INTRAVENOUS ONCE AS NEEDED
Status: CANCELLED | OUTPATIENT
Start: 2025-04-28

## 2025-04-28 RX ORDER — SODIUM CHLORIDE, SODIUM LACTATE, POTASSIUM CHLORIDE, CALCIUM CHLORIDE 600; 310; 30; 20 MG/100ML; MG/100ML; MG/100ML; MG/100ML
100 INJECTION, SOLUTION INTRAVENOUS CONTINUOUS
Status: CANCELLED | OUTPATIENT
Start: 2025-04-28 | End: 2025-04-28

## 2025-04-28 RX ORDER — PROPOFOL 10 MG/ML
INJECTION, EMULSION INTRAVENOUS AS NEEDED
Status: DISCONTINUED | OUTPATIENT
Start: 2025-04-28 | End: 2025-04-28

## 2025-04-28 RX ORDER — NORETHINDRONE AND ETHINYL ESTRADIOL 0.5-0.035
KIT ORAL AS NEEDED
Status: DISCONTINUED | OUTPATIENT
Start: 2025-04-28 | End: 2025-04-28

## 2025-04-28 RX ORDER — ONDANSETRON HYDROCHLORIDE 2 MG/ML
4 INJECTION, SOLUTION INTRAVENOUS EVERY 6 HOURS PRN
Status: DISCONTINUED | OUTPATIENT
Start: 2025-04-28 | End: 2025-04-29 | Stop reason: HOSPADM

## 2025-04-28 RX ORDER — SODIUM CHLORIDE, SODIUM LACTATE, POTASSIUM CHLORIDE, CALCIUM CHLORIDE 600; 310; 30; 20 MG/100ML; MG/100ML; MG/100ML; MG/100ML
50 INJECTION, SOLUTION INTRAVENOUS CONTINUOUS
Status: ACTIVE | OUTPATIENT
Start: 2025-04-28 | End: 2025-04-29

## 2025-04-28 RX ORDER — FENTANYL CITRATE 50 UG/ML
25 INJECTION, SOLUTION INTRAMUSCULAR; INTRAVENOUS EVERY 5 MIN PRN
Refills: 0 | Status: CANCELLED | OUTPATIENT
Start: 2025-04-28

## 2025-04-28 RX ORDER — OXYCODONE HYDROCHLORIDE 5 MG/1
10 TABLET ORAL EVERY 4 HOURS PRN
Refills: 0 | Status: DISPENSED | OUTPATIENT
Start: 2025-04-28 | End: 2025-04-28

## 2025-04-28 RX ORDER — BUPIVACAINE HYDROCHLORIDE 5 MG/ML
INJECTION, SOLUTION PERINEURAL AS NEEDED
Status: DISCONTINUED | OUTPATIENT
Start: 2025-04-28 | End: 2025-04-28 | Stop reason: HOSPADM

## 2025-04-28 RX ADMIN — OXYCODONE HYDROCHLORIDE 10 MG: 5 TABLET ORAL at 19:00

## 2025-04-28 RX ADMIN — MONTELUKAST 10 MG: 10 TABLET, FILM COATED ORAL at 20:37

## 2025-04-28 RX ADMIN — FENTANYL CITRATE 50 MCG: 50 INJECTION, SOLUTION INTRAMUSCULAR; INTRAVENOUS at 18:12

## 2025-04-28 RX ADMIN — HYDROMORPHONE HYDROCHLORIDE 0.4 MG: 0.5 INJECTION, SOLUTION INTRAMUSCULAR; INTRAVENOUS; SUBCUTANEOUS at 18:55

## 2025-04-28 RX ADMIN — SERTRALINE 100 MG: 100 TABLET, FILM COATED ORAL at 20:37

## 2025-04-28 RX ADMIN — Medication 100 MCG: at 17:02

## 2025-04-28 RX ADMIN — PROPOFOL 150 MG: 10 INJECTION, EMULSION INTRAVENOUS at 16:20

## 2025-04-28 RX ADMIN — IBUPROFEN 400 MG: 400 TABLET, FILM COATED ORAL at 23:08

## 2025-04-28 RX ADMIN — Medication 100 MCG: at 16:34

## 2025-04-28 RX ADMIN — Medication 100 MCG: at 18:19

## 2025-04-28 RX ADMIN — PANTOPRAZOLE SODIUM 20 MG: 20 TABLET, DELAYED RELEASE ORAL at 06:30

## 2025-04-28 RX ADMIN — FENTANYL CITRATE 25 MCG: 50 INJECTION, SOLUTION INTRAMUSCULAR; INTRAVENOUS at 17:18

## 2025-04-28 RX ADMIN — ONDANSETRON HYDROCHLORIDE 4 MG: 2 INJECTION INTRAMUSCULAR; INTRAVENOUS at 18:19

## 2025-04-28 RX ADMIN — Medication 100 MCG: at 16:28

## 2025-04-28 RX ADMIN — FENTANYL CITRATE 50 MCG: 50 INJECTION, SOLUTION INTRAMUSCULAR; INTRAVENOUS at 16:20

## 2025-04-28 RX ADMIN — LIDOCAINE HYDROCHLORIDE 50 MG: 20 INJECTION, SOLUTION INFILTRATION; PERINEURAL at 16:20

## 2025-04-28 RX ADMIN — ALPRAZOLAM 0.5 MG: 0.5 TABLET ORAL at 05:43

## 2025-04-28 RX ADMIN — FENTANYL CITRATE 50 MCG: 50 INJECTION, SOLUTION INTRAMUSCULAR; INTRAVENOUS at 17:51

## 2025-04-28 RX ADMIN — HEPARIN SODIUM 5000 UNITS: 5000 INJECTION INTRAVENOUS; SUBCUTANEOUS at 20:34

## 2025-04-28 RX ADMIN — TEMAZEPAM 15 MG: 15 CAPSULE ORAL at 23:08

## 2025-04-28 RX ADMIN — Medication 100 MCG: at 16:51

## 2025-04-28 RX ADMIN — Medication 100 MCG: at 17:10

## 2025-04-28 RX ADMIN — CEFAZOLIN SODIUM 2 G: 2 SOLUTION INTRAVENOUS at 16:25

## 2025-04-28 RX ADMIN — FENOFIBRATE 160 MG: 160 TABLET ORAL at 20:37

## 2025-04-28 RX ADMIN — PRAVASTATIN SODIUM 10 MG: 10 TABLET ORAL at 20:37

## 2025-04-28 RX ADMIN — FENTANYL CITRATE 25 MCG: 50 INJECTION, SOLUTION INTRAMUSCULAR; INTRAVENOUS at 17:20

## 2025-04-28 RX ADMIN — DEXAMETHASONE SODIUM PHOSPHATE 4 MG: 4 INJECTION, SOLUTION INTRAMUSCULAR; INTRAVENOUS at 16:36

## 2025-04-28 RX ADMIN — ACETAMINOPHEN 1000 MG: 500 TABLET ORAL at 20:36

## 2025-04-28 RX ADMIN — SODIUM CHLORIDE, POTASSIUM CHLORIDE, SODIUM LACTATE AND CALCIUM CHLORIDE: 600; 310; 30; 20 INJECTION, SOLUTION INTRAVENOUS at 16:08

## 2025-04-28 RX ADMIN — BUSPIRONE HYDROCHLORIDE 10 MG: 10 TABLET ORAL at 20:37

## 2025-04-28 RX ADMIN — EPHEDRINE SULFATE 10 MG: 50 INJECTION, SOLUTION INTRAVENOUS at 16:39

## 2025-04-28 SDOH — HEALTH STABILITY: MENTAL HEALTH: CURRENT SMOKER: 0

## 2025-04-28 ASSESSMENT — COGNITIVE AND FUNCTIONAL STATUS - GENERAL
STANDING UP FROM CHAIR USING ARMS: A LITTLE
CLIMB 3 TO 5 STEPS WITH RAILING: A LOT
DRESSING REGULAR LOWER BODY CLOTHING: A LITTLE
MOVING TO AND FROM BED TO CHAIR: A LITTLE
MOBILITY SCORE: 11
WALKING IN HOSPITAL ROOM: TOTAL
WALKING IN HOSPITAL ROOM: A LOT
PERSONAL GROOMING: A LITTLE
TURNING FROM BACK TO SIDE WHILE IN FLAT BAD: A LITTLE
MOVING FROM LYING ON BACK TO SITTING ON SIDE OF FLAT BED WITH BEDRAILS: A LOT
MOVING TO AND FROM BED TO CHAIR: A LOT
MOBILITY SCORE: 18
STANDING UP FROM CHAIR USING ARMS: A LOT
CLIMB 3 TO 5 STEPS WITH RAILING: TOTAL
DAILY ACTIVITIY SCORE: 22

## 2025-04-28 ASSESSMENT — PAIN SCALES - GENERAL
PAINLEVEL_OUTOF10: 9
PAINLEVEL_OUTOF10: 1
PAINLEVEL_OUTOF10: 3
PAINLEVEL_OUTOF10: 0 - NO PAIN
PAINLEVEL_OUTOF10: 6
PAINLEVEL_OUTOF10: 0 - NO PAIN
PAINLEVEL_OUTOF10: 0 - NO PAIN
PAINLEVEL_OUTOF10: 10 - WORST POSSIBLE PAIN
PAINLEVEL_OUTOF10: 0 - NO PAIN

## 2025-04-28 ASSESSMENT — PAIN - FUNCTIONAL ASSESSMENT
PAIN_FUNCTIONAL_ASSESSMENT: 0-10

## 2025-04-28 ASSESSMENT — PAIN DESCRIPTION - ORIENTATION
ORIENTATION: LEFT

## 2025-04-28 ASSESSMENT — PAIN DESCRIPTION - DESCRIPTORS
DESCRIPTORS: ACHING
DESCRIPTORS: ACHING;SORE;TENDER

## 2025-04-28 ASSESSMENT — PAIN DESCRIPTION - LOCATION
LOCATION: FOOT

## 2025-04-28 NOTE — SIGNIFICANT EVENT
PODIATRY POSTOP UPDATE    S/P ORIF of metatarsal 4, 5 shaft fractures, left foot    WB status: Nonweightbearing to the left lower extremity. Patient has fiberglass splint in place.  Elevate left LE to manage pain and edema.  Podiatry to change dressing within the next 24-48 hours.   Nursing may reinforce dressing for bleeding with ACE wraps.    Restart all medications and adult diet.  Pain and other medical management per primary team.     ______________________________  Aimee Quiñones DPM PGY2  Podiatric Medicine & Surgery

## 2025-04-28 NOTE — ANESTHESIA PROCEDURE NOTES
Airway  Date/Time: 4/28/2025 4:23 PM  Reason: elective    Airway not difficult    Staffing  Performed: CRNA   Authorized by: Kalani Bruce MD    Performed by: KENN Newton-CRNA  Patient location during procedure: OR    Patient Condition  Indications for airway management: anesthesia  Patient position: sniffing  MILS maintained throughout  Sedation level: deep     Final Airway Details   Preoxygenated: yes  Final airway type: supraglottic airway  Successful airway:   Size: 4  Number of attempts at approach: 1

## 2025-04-28 NOTE — CARE PLAN
The patient's goals for the shift include Try and rest.    The clinical goals for the shift include rest    Problem: Fall/Injury  Goal: Not fall by end of shift  4/28/2025 1828 by Marisela Wilburn RN  Outcome: Progressing  4/28/2025 1827 by Marisela Wilburn RN  Outcome: Progressing  Goal: Be free from injury by end of the shift  4/28/2025 1828 by Marisela Wilburn RN  Outcome: Progressing  4/28/2025 1827 by Marisela Wilburn RN  Outcome: Progressing  Goal: Verbalize understanding of personal risk factors for fall in the hospital  4/28/2025 1828 by Marisela Wilburn RN  Outcome: Progressing  4/28/2025 1827 by Marisela Wilburn RN  Outcome: Progressing  Goal: Verbalize understanding of risk factor reduction measures to prevent injury from fall in the home  4/28/2025 1828 by Marisela Wilburn RN  Outcome: Progressing  4/28/2025 1827 by Marisela Wilburn RN  Outcome: Progressing  Goal: Use assistive devices by end of the shift  4/28/2025 1828 by Marisela Wilburn RN  Outcome: Progressing  4/28/2025 1827 by Marisela Wilburn RN  Outcome: Progressing  Goal: Pace activities to prevent fatigue by end of the shift  4/28/2025 1828 by Marisela Wilburn RN  Outcome: Progressing  4/28/2025 1827 by Marisela Wilburn RN  Outcome: Progressing     Problem: Safety - Adult  Goal: Free from fall injury  4/28/2025 1828 by Marisela Wilburn RN  Outcome: Progressing  4/28/2025 1827 by Marisela Wilburn RN  Outcome: Progressing     Problem: Discharge Planning  Goal: Discharge to home or other facility with appropriate resources  4/28/2025 1828 by Marisela Wilburn RN  Outcome: Progressing  4/28/2025 1827 by Marisela Wilburn RN  Outcome: Progressing     Problem: Chronic Conditions and Co-morbidities  Goal: Patient's chronic conditions and co-morbidity symptoms are monitored and maintained or improved  4/28/2025 1828 by Marisela Wilburn RN  Outcome: Progressing  4/28/2025 1827 by Marisela Wilburn RN  Outcome: Progressing     Problem: Nutrition  Goal: Nutrient intake appropriate for  maintaining nutritional needs  4/28/2025 1828 by Marisela Wilburn RN  Outcome: Progressing  4/28/2025 1827 by Marisela Wilburn RN  Outcome: Progressing     Problem: Pain  Goal: Takes deep breaths with improved pain control throughout the shift  4/28/2025 1828 by Marisela Wilburn RN  Outcome: Progressing  4/28/2025 1827 by Marisela Wilburn RN  Outcome: Progressing  Goal: Turns in bed with improved pain control throughout the shift  4/28/2025 1828 by Marisela Wilburn RN  Outcome: Progressing  4/28/2025 1827 by Marisela Wilburn RN  Outcome: Progressing  Goal: Walks with improved pain control throughout the shift  4/28/2025 1828 by Marisela Wilburn RN  Outcome: Progressing  4/28/2025 1827 by Marisela Wilburn RN  Outcome: Progressing  Goal: Performs ADL's with improved pain control throughout shift  4/28/2025 1828 by Marisela Wilburn RN  Outcome: Progressing  4/28/2025 1827 by Marisela Wilburn RN  Outcome: Progressing  Goal: Participates in PT with improved pain control throughout the shift  4/28/2025 1828 by Marisela Wilburn RN  Outcome: Progressing  4/28/2025 1827 by Marisela Wilburn RN  Outcome: Progressing  Goal: Free from opioid side effects throughout the shift  4/28/2025 1828 by Marisela Wilburn RN  Outcome: Progressing  4/28/2025 1827 by Marisela Wilburn RN  Outcome: Progressing  Goal: Free from acute confusion related to pain meds throughout the shift  4/28/2025 1828 by Marisela Wilburn RN  Outcome: Progressing  4/28/2025 1827 by Marisela Wilburn RN  Outcome: Progressing     Problem: Skin  Goal: Decreased wound size/increased tissue granulation at next dressing change  Outcome: Progressing  Flowsheets (Taken 4/28/2025 1828)  Decreased wound size/increased tissue granulation at next dressing change:   Promote sleep for wound healing   Protective dressings over bony prominences   Utilize specialty bed per algorithm  Goal: Participates in plan/prevention/treatment measures  Outcome: Progressing  Flowsheets (Taken 4/28/2025 1828)  Participates in  plan/prevention/treatment measures:   Discuss with provider PT/OT consult   Elevate heels   Increase activity/out of bed for meals  Goal: Prevent/manage excess moisture  Outcome: Progressing  Flowsheets (Taken 4/28/2025 1828)  Prevent/manage excess moisture:   Cleanse incontinence/protect with barrier cream   Monitor for/manage infection if present   Follow provider orders for dressing changes   Use wicking fabric (obtain order)   Moisturize dry skin  Goal: Prevent/minimize sheer/friction injuries  Outcome: Progressing  Flowsheets (Taken 4/28/2025 1828)  Prevent/minimize sheer/friction injuries:   Complete micro-shifts as needed if patient unable. Adjust patient position to relieve pressure points, not a full turn   Increase activity/out of bed for meals   Use pull sheet   HOB 30 degrees or less   Turn/reposition every 2 hours/use positioning/transfer devices   Utilize specialty bed per algorithm  Goal: Promote/optimize nutrition  Outcome: Progressing  Flowsheets (Taken 4/28/2025 1828)  Promote/optimize nutrition:   Assist with feeding   Offer water/supplements/favorite foods   Consume > 50% meals/supplements   Discuss with provider if NPO > 2 days   Monitor/record intake including meals  Goal: Promote skin healing  Outcome: Progressing  Flowsheets (Taken 4/28/2025 1828)  Promote skin healing:   Assess skin/pad under line(s)/device(s)   Protective dressings over bony prominences   Turn/reposition every 2 hours/use positioning/transfer devices   Ensure correct size (line/device) and apply per  instructions   Rotate device position/do not position patient on device

## 2025-04-28 NOTE — BRIEF OP NOTE
Date: 2025 - 2025  OR Location: [facility] OR    Name: Francine Romero, : 1940, Age: 84 y.o., MRN: [id number], Sex: female    Diagnosis  Pre-op Diagnosis      * Closed displaced fracture of metatarsal bone, unspecified laterality, unspecified metatarsal, initial encounter [S92.309A] Post-op Diagnosis     * Closed displaced fracture of metatarsal bone, unspecified laterality, unspecified metatarsal, initial encounter [S92.309A]     Procedures  ORIF, DIGIT  45075 - [place] OPEN TREATMENT METATARSAL FRACTURE EACH  4th metatarsal and 5th metatarsal    Surgeons      * Rachel French - Primary    Resident/Fellow/Other Assistant:  Surgeons and Role:     * Jim Kerns DPM - Resident - Assisting     * Aimee Quiñones DPM - Resident - Assisting    Staff:   Circulator: Ermelinda Raya Person: Deja Roberts Circulator: Candace Roberts Scrub: Colleen    Anesthesia Staff: Anesthesiologist: Kalani Bruce MD  CRNA: KENN Newton-MARQUES    Procedure Summary  Anesthesia: General  ASA: II  Estimated Blood Loss: 5mL  Intra-op Medications: Administrations occurring from 1345 to 1550 on 25:  * No intraprocedure medications in log *           Anesthesia Record               Intraprocedure I/O Totals          Intake    ceFAZolin (Ancef) 2 g in dextrose (iso) IV 50 mL 50.00 mL    Total Intake 50 mL       Output    Est. Blood Loss 10 mL    Total Output 10 mL       Net    Net Volume 40 mL          Specimen: No specimens collected               Findings: see op note    Complications:  None; patient tolerated the procedure well.     Disposition: PACU - hemodynamically stable.  Condition: stable  Specimens Collected: No specimens collected  Attending Attestation: I was present and scrubbed for the entire procedure.    Rachel French  Phone Number: [phone]

## 2025-04-28 NOTE — PROGRESS NOTES
LPCC met with pt who is waiting for procedure today. Discussed PT eval showing she would benefit from moderate intensity therapy needs, ampac of 11 and benefit from short stay at SNF. Pt not excited about this idea however asked this worker to look at SNF list where her family had indicated choices. Choices are [facility], [facility], [facility] and [facility]. Referrals being sent to these facilities however therapy will also need to work with the pt post procedure as well. No precert needed.      04/28/25 1010   Discharge Planning   Expected Discharge Disposition SNF

## 2025-04-28 NOTE — ANESTHESIA POSTPROCEDURE EVALUATION
Patient: Francine Romero    Procedure Summary       Date: 04/28/25 Room / Location: [facility] OR 05 / [facility]    Anesthesia Start: 1608 Anesthesia Stop: 1842    Procedure: ORIF, DIGIT (Left) Diagnosis:       Closed displaced fracture of metatarsal bone, unspecified laterality, unspecified metatarsal, initial encounter      (Closed displaced fracture of metatarsal bone, unspecified laterality, unspecified metatarsal, initial encounter [S92.309A])    Surgeons: Rachel French DPM Responsible Provider: Kalani Bruce MD    Anesthesia Type: general ASA Status: 2            Anesthesia Type: general    Vitals Value Taken Time   /68 04/28/25 18:45   Temp 35.8 °C (96.4 °F) 04/28/25 18:39   Pulse 86 04/28/25 18:49   Resp 11 04/28/25 18:49   SpO2 96 % 04/28/25 18:49   Vitals shown include unfiled device data.    Anesthesia Post Evaluation    Patient location during evaluation: bedside  Patient participation: complete - patient participated  Level of consciousness: awake and alert  Pain management: adequate  Multimodal analgesia pain management approach  Airway patency: patent  Cardiovascular status: acceptable  Respiratory status: acceptable  Hydration status: acceptable  Postoperative Nausea and Vomiting: none        No notable events documented.

## 2025-04-28 NOTE — NURSING NOTE
Assumed care of patient. Patient is sound asleep in bed. She did not notice that we did BSSR. Bed alarm was activated, call light is within reach, will continue to monitor.

## 2025-04-28 NOTE — PROGRESS NOTES
Francine Romero is a 84 y.o. female on day 2 of admission presenting with Closed displaced fracture of metatarsal bone, unspecified laterality, unspecified metatarsal, initial encounter.    Subjective   Patient seen and examined.  Resting sitting up in the chair in no acute distress.  Daughter bedside.  Awake alert oriented x 3.  No right foot pain.  Nauseated attributes to anxiety.  No vomiting.  No other complaints.  NPO to OR today.    Spoke with nursing, no new issues.    Objective     Physical Exam  Vitals and nursing note reviewed.   Constitutional:       General: She is not in acute distress.     Appearance: Normal appearance. She is normal weight. She is not ill-appearing, toxic-appearing or diaphoretic.   HENT:      Head: Normocephalic and atraumatic.      Right Ear: External ear normal.      Left Ear: External ear normal.      Nose: Nose normal.      Mouth/Throat:      Mouth: Mucous membranes are moist.      Pharynx: Oropharynx is clear.   Eyes:      Extraocular Movements: Extraocular movements intact.      Conjunctiva/sclera: Conjunctivae normal.      Pupils: Pupils are equal, round, and reactive to light.   Cardiovascular:      Rate and Rhythm: Normal rate and regular rhythm.      Pulses: Normal pulses.      Heart sounds: Normal heart sounds. No murmur heard.  Pulmonary:      Effort: Pulmonary effort is normal. No respiratory distress.      Breath sounds: Normal breath sounds. No wheezing, rhonchi or rales.   Abdominal:      General: Bowel sounds are normal.      Palpations: Abdomen is soft.   Genitourinary:     Comments: Deferred.  Musculoskeletal:         General: Swelling and tenderness present. Normal range of motion.      Cervical back: Normal range of motion and neck supple.      Right lower leg: Edema present.      Left lower leg: No edema.      Comments: Right lower extremity splint, dressing clean dry intact.    Skin:     General: Skin is warm and dry.      Capillary Refill: Capillary refill  "takes less than 2 seconds.   Neurological:      General: No focal deficit present.      Mental Status: She is alert and oriented to person, place, and time.   Psychiatric:         Mood and Affect: Mood normal.         Behavior: Behavior normal.       Last Recorded Vitals  Blood pressure 140/73, pulse 76, temperature 36.4 °C (97.6 °F), temperature source Oral, resp. rate 13, height 1.727 m (5' 8\"), weight 88.4 kg (194 lb 14.2 oz), SpO2 96%.    Intake/Output last 3 Shifts:  I/O last 3 completed shifts:  In: 1340 (15.2 mL/kg) [P.O.:1340]  Out: 450 (5.1 mL/kg) [Urine:450 (0.1 mL/kg/hr)]  Weight: 88.4 kg     Relevant Results  Results for orders placed or performed during the hospital encounter of 04/25/25 (from the past 24 hours)   Electrocardiogram, 12-lead PRN ACS symptoms   Result Value Ref Range    Ventricular Rate 86 BPM    Atrial Rate 86 BPM    GA Interval 166 ms    QRS Duration 84 ms    QT Interval 344 ms    QTC Calculation(Bazett) 411 ms    P Axis 51 degrees    R Axis -2 degrees    T Axis 57 degrees    QRS Count 14 beats    Q Onset 228 ms    P Onset 145 ms    P Offset 204 ms    T Offset 400 ms    QTC Fredericia 388 ms     No results found for the last 90 days.    Electrocardiogram, 12-lead PRN ACS symptoms  Result Date: 4/28/2025  Normal sinus rhythm Moderate voltage criteria for LVH, may be normal variant Nonspecific ST and T wave abnormality Abnormal ECG When compared with ECG of 25-APR-2025 14:34, QT has lengthened      Scheduled medications  Scheduled Medications[1]  Continuous medications  Continuous Medications[2]  PRN medications  PRN Medications[3]    ASSESSMENT:  Fall  Closed displaced fracture metatarsal bone  Acute right ankle pain  Nausea  Hypertension  Hyperlipidemia  Depression  Anxiety  Insomnia    PLAN:  Twelve-lead EKG reviewed with Dr. Maria.  No acute EKG changes.  No chest pain symptoms.  Cleared to OR.  N.p.o. 2 OR today.  As needed Zofran for nausea.  PPI Protonix.  Pain control.  " Post-operative care per Podiatry.  PT/OT, activity, DVT prophylaxis post-operatively per Podiatry.  Continue home medications as prescribed as appropriate.  Monitor blood pressure and heart rate.  Monitor BMP, CBC.  Labs in AM.  PT/OT.  Fall precautions.  DVT prophylaxis.  Heparin subcutaneous (Held to OR).  GI prophylaxis.  PPI Protonix.  Supportive care.  Patient and daughter at bedside reassured.  Case management following for discharge planning.  Anticipate discharge post-operatively per therapy's recommendations, when arrangements are made by case management.  Discussed with patient, daughter, nursing.  Discussed with Dr. Maria.      Susanne Vasquez, APRN-CNP         [1] ALPRAZolam, 0.5 mg, oral, Daily  amLODIPine, 10 mg, oral, Daily  busPIRone, 10 mg, oral, BID  ceFAZolin, 2 g, intravenous, Once  ergocalciferol, 1.25 mg, oral, Weekly  fenofibrate, 160 mg, oral, Daily  heparin, 5,000 Units, subcutaneous, q8h  hydroCHLOROthiazide, 25 mg, oral, q AM  lubiprostone, 8 mcg, oral, BID  montelukast, 10 mg, oral, Daily before evening meal  pantoprazole, 20 mg, oral, Daily before breakfast  pravastatin, 10 mg, oral, Nightly  sertraline, 100 mg, oral, Daily  [2]    [3] PRN medications: acetaminophen, calcium carbonate, cetirizine, fluticasone, gabapentin, ibuprofen, ondansetron, simethicone, sucralfate, temazepam

## 2025-04-28 NOTE — ANESTHESIA PREPROCEDURE EVALUATION
Patient: Francine Romero    Procedure Information       Date/Time: 04/28/25 1345    Procedure: ORIF, DIGIT (Left)    Location: [facility] 05 / [facility]    Surgeons: Rachel French DPM            Relevant Problems   Anesthesia (within normal limits)      Cardiac   (+) HTN (hypertension)   (+) Hyperlipidemia      Neuro   (+) Anxiety   (+) Depression      GI   (+) PUD (peptic ulcer disease)      Musculoskeletal   (+) Closed displaced fracture of metatarsal bone, unspecified laterality, unspecified metatarsal, initial encounter      Musculoskeletal and Injuries   (+) H/O total knee replacement, left     Past Medical History:   Diagnosis Date    Dry eye syndrome of bilateral lacrimal glands     Other vitreous opacities, bilateral     Presence of intraocular lens     S/P YAG capsulotomy, bilateral     OD 2007, OS 2022    Unspecified disorder of refraction     Unspecified peripheral retinal degeneration        Past Surgical History:   Procedure Laterality Date    CATARACT EXTRACTION W/  INTRAOCULAR LENS IMPLANT Right 11/10/2002    +15.5 D    CATARACT EXTRACTION W/  INTRAOCULAR LENS IMPLANT Left 11/09/2017    +13.0 D    HYSTERECTOMY      KNEE ARTHROPLASTY      2022   Left TKA      Allergies   Allergen Reactions    Pregabalin Other     Tingling around the mouth     Eszopiclone Dizziness       Clinical information reviewed:   Tobacco  Allergies  Meds   Med Hx  Surg Hx   Fam Hx  Soc Hx        NPO Detail:  No data recorded     Physical Exam    Airway  Mallampati: II  TM distance: >3 FB  Neck ROM: limited  Mouth opening: 3 or more finger widths     Cardiovascular    Dental    Pulmonary    Abdominal            Anesthesia Plan    History of general anesthesia?: yes  History of complications of general anesthesia?: no    ASA 2     general     The patient is not a current smoker.  Education provided regarding risk of obstructive sleep apnea.  intravenous induction   Anesthetic plan and risks discussed with  patient.  Use of blood products discussed with patient who.    Plan discussed with CRNA and CAA.

## 2025-04-28 NOTE — NURSING NOTE
Pt asleep. No s/s of pain/discomfort noted. No change noted from initial shift assessment. Dsg to Lt foot remains C/D/I. Pt is clear liquids up to 2 hours prior to Sx. Will admin PO meds per order. Will continue to monitor. Call light in reach.

## 2025-04-28 NOTE — PROGRESS NOTES
Physical Therapy    Physical Therapy Treatment    Patient Name: Francine Romero  MRN: [id number]  Department: [address]  Room: [address] [place] [address]  Today's Date: 4/28/2025  Time Calculation  Start Time: 1035  Stop Time: 1105  Time Calculation (min): 30 min         Assessment/Plan   PT Assessment  PT Assessment Results: Decreased strength, Decreased mobility, Decreased range of motion, Decreased endurance, Impaired balance, Decreased safety awareness, Impaired hearing, Impaired sensation, Pain  Rehab Prognosis: Good  Barriers to Discharge Home: Caregiver assistance, Physical needs  Caregiver Assistance: Caregiver assistance needed per identified barriers - however, level of patient's required assistance exceeds assistance available at home  Physical Needs: Ambulating household distances limited by function/safety, 24hr mobility assistance needed, High falls risk due to function or environment  Evaluation/Treatment Tolerance: Patient limited by fatigue  Medical Staff Made Aware: Yes  Strengths: Support of extended family/friends  Barriers to Participation: Comorbidities  End of Session Communication: Bedside nurse  Assessment Comment: Patient limited in ability to ambulate with RW and maintain NWB L LE. Will require moderate intensity rehab follow up. patient is scheduled for sx this date.  End of Session Patient Position: Up in chair, Alarm on     PT Plan  Treatment/Interventions: Bed mobility, Transfer training, Endurance training, Therapeutic exercise, Therapeutic activity  PT Plan: Ongoing PT  PT Frequency: 5 times per week  PT Discharge Recommendations: Moderate intensity level of continued care  Equipment Recommended upon Discharge: Wheeled walker, Wheelchair  PT Recommended Transfer Status: Assist x1, Assistive device  PT - OK to Discharge: Yes      General Visit Information:   PT  Visit  PT Received On: 04/28/25  Response to Previous Treatment: Patient with no complaints from previous session.  General  Reason for Referral:  Impaired functional mobility due to decreased muscular strength. This 84 year old female was admitted for acute intra-articular fx of L 5th metatarsal base and acute comminuted fxs of mid & distal shafts of L 4th & 5th metatarsals. Surgical repair was tentatively planned for 4/28/25. Podiatry and vascular surgery requested pt be evaluated today.  Past Medical History Relevant to Rehab: anxiety, HTN, L TKA  Prior to Session Communication: Bedside nurse  Patient Position Received: Bed, 2 rail up, Alarm off, not on at start of session  Preferred Learning Style: auditory, verbal  General Comment: Cleared by RN for participation. Pt agreed to session and was fully engaged throughout.    Subjective   Precautions:  Precautions  Hearing/Visual Limitations: bilateral hearing aids (not with her), glasses at all times  LE Weight Bearing Status: Left Non-Weight Bearing  Medical Precautions: Fall precautions  Precautions Comment: Pt educated in LLE NWB status.     Date/Time Vitals Session Patient Position Pulse Resp SpO2 BP MAP (mmHg)    04/28/25 1100 --  --  110  16  97 %  141/66  85                 Objective   Pain:  Pain Assessment  Pain Assessment: 0-10  0-10 (Numeric) Pain Score: 0 - No pain  Cognition:  Cognition  Overall Cognitive Status: Within Functional Limits  Orientation Level: Oriented X4  Coordination:  Movements are Fluid and Coordinated: No  Lower Body Coordination: L LE impaired  Coordination Comment: NWB L LE  Postural Control:  Postural Control  Postural Control: Within Functional Limits  Static Standing Balance  Static Standing-Balance Support: Bilateral upper extremity supported  Static Standing-Level of Assistance: Contact guard (able to maintain NWB L LE)  Dynamic Standing Balance  Dynamic Standing-Balance Support: Bilateral upper extremity supported  Dynamic Standing-Level of Assistance: Minimum assistance  Dynamic Standing-Comments: live on R LE to transfer bed to chair  Extremity/Trunk  Assessments:     Activity Tolerance:  Activity Tolerance  Endurance: Decreased tolerance for upright activites  Early Mobility/Exercise Safety Screen: Proceed with mobilization - No exclusion criteria met  Activity Tolerance Comments: fair overall for activity  Treatments:  Therapeutic Exercise  Therapeutic Exercise Activity 1: Seated LAQ, hip flex, hip adduction/abduction against manual resistance 2x15 reps    Bed Mobility 1  Bed Mobility 1: Supine to sitting  Level of Assistance 1: Minimum assistance  Bed Mobility Comments 1: assist for trunk up from flat bed    Ambulation/Gait Training 1  Quality of Gait 1:  (attempted ambulation via NWB L LE, patient not able to maintain at this time for forward ambulation, only able to pivot on R LE to chair with RW)  Transfer 1  Technique 1: Sit to stand  Transfer Device 1: Walker  Transfer Level of Assistance 1: Moderate assistance (cues for safe hand placement)  Transfers 2  Technique 2: Stand pivot  Transfer Device 2: Walker  Transfer Level of Assistance 2: Minimum assistance (bed to chair)    Outcome Measures:  [facility] Basic Mobility  Turning from your back to your side while in a flat bed without using bedrails: A lot  Moving from lying on your back to sitting on the side of a flat bed without using bedrails: A little  Moving to and from bed to chair (including a wheelchair): A lot  Standing up from a chair using your arms (e.g. wheelchair or bedside chair): A lot  To walk in hospital room: Total  Climbing 3-5 steps with railing: Total  Basic Mobility - Total Score: 11    Education Documentation  Mobility Training, taught by Katie High, PT at 4/28/2025 12:41 PM.  Learner: Patient  Readiness: Acceptance  Method: Explanation  Response: Verbalizes Understanding, Needs Reinforcement  Comment: Education provided re: safe mobility technique, NWB L LE, use of walker and wheelchair due to NWB L LE                 Encounter Problems       Encounter Problems (Active)       PT  Problem       Pt will transition supine<>sit with mod I. (Progressing)       Start:  04/26/25    Expected End:  05/16/25            Pt will transfer sit<>stand with FWW and close S while maintaining LLE NWB. (Progressing)       Start:  04/26/25    Expected End:  05/16/25            Pt will transfer bed<>chair with FWW and close S while maintaining LLE NWB. (Progressing)       Start:  04/26/25    Expected End:  05/16/25            Pt will ambulate >/=5 ft with FWW and CGA while maintaining LLE NWB. (Progressing)       Start:  04/26/25    Expected End:  05/16/25

## 2025-04-29 VITALS
OXYGEN SATURATION: 95 % | SYSTOLIC BLOOD PRESSURE: 115 MMHG | WEIGHT: 195.33 LBS | TEMPERATURE: 97.9 F | BODY MASS INDEX: 29.6 KG/M2 | DIASTOLIC BLOOD PRESSURE: 44 MMHG | RESPIRATION RATE: 16 BRPM | HEIGHT: 68 IN | HEART RATE: 63 BPM

## 2025-04-29 LAB
ANION GAP SERPL CALCULATED.3IONS-SCNC: 12 MMOL/L (ref 10–20)
BUN SERPL-MCNC: 20 MG/DL (ref 6–23)
CALCIUM SERPL-MCNC: 9.3 MG/DL (ref 8.6–10.3)
CHLORIDE SERPL-SCNC: 102 MMOL/L (ref 98–107)
CO2 SERPL-SCNC: 26 MMOL/L (ref 21–32)
CREAT SERPL-MCNC: 0.79 MG/DL (ref 0.5–1.05)
EGFRCR SERPLBLD CKD-EPI 2021: 73 ML/MIN/1.73M*2
ERYTHROCYTE [DISTWIDTH] IN BLOOD BY AUTOMATED COUNT: 13 % (ref 11.5–14.5)
GLUCOSE SERPL-MCNC: 119 MG/DL (ref 74–99)
HCT VFR BLD AUTO: 37.7 % (ref 36–46)
HGB BLD-MCNC: 12.4 G/DL (ref 12–16)
MCH RBC QN AUTO: 30.8 PG (ref 26–34)
MCHC RBC AUTO-ENTMCNC: 32.9 G/DL (ref 32–36)
MCV RBC AUTO: 94 FL (ref 80–100)
NRBC BLD-RTO: 0 /100 WBCS (ref 0–0)
PLATELET # BLD AUTO: 336 X10*3/UL (ref 150–450)
POTASSIUM SERPL-SCNC: 4 MMOL/L (ref 3.5–5.3)
RBC # BLD AUTO: 4.03 X10*6/UL (ref 4–5.2)
SODIUM SERPL-SCNC: 136 MMOL/L (ref 136–145)
WBC # BLD AUTO: 8.6 X10*3/UL (ref 4.4–11.3)

## 2025-04-29 PROCEDURE — 85027 COMPLETE CBC AUTOMATED: CPT | Performed by: NURSE PRACTITIONER

## 2025-04-29 PROCEDURE — 2500000001 HC RX 250 WO HCPCS SELF ADMINISTERED DRUGS (ALT 637 FOR MEDICARE OP): Performed by: INTERNAL MEDICINE

## 2025-04-29 PROCEDURE — 2500000002 HC RX 250 W HCPCS SELF ADMINISTERED DRUGS (ALT 637 FOR MEDICARE OP, ALT 636 FOR OP/ED): Performed by: INTERNAL MEDICINE

## 2025-04-29 PROCEDURE — 97110 THERAPEUTIC EXERCISES: CPT | Mod: GP

## 2025-04-29 PROCEDURE — 2500000004 HC RX 250 GENERAL PHARMACY W/ HCPCS (ALT 636 FOR OP/ED): Mod: JZ | Performed by: INTERNAL MEDICINE

## 2025-04-29 PROCEDURE — 80048 BASIC METABOLIC PNL TOTAL CA: CPT | Performed by: NURSE PRACTITIONER

## 2025-04-29 PROCEDURE — 97530 THERAPEUTIC ACTIVITIES: CPT | Mod: GP

## 2025-04-29 PROCEDURE — 82374 ASSAY BLOOD CARBON DIOXIDE: CPT | Performed by: NURSE PRACTITIONER

## 2025-04-29 PROCEDURE — 36415 COLL VENOUS BLD VENIPUNCTURE: CPT | Performed by: NURSE PRACTITIONER

## 2025-04-29 PROCEDURE — 97164 PT RE-EVAL EST PLAN CARE: CPT | Mod: GP

## 2025-04-29 RX ADMIN — HEPARIN SODIUM 5000 UNITS: 5000 INJECTION INTRAVENOUS; SUBCUTANEOUS at 03:17

## 2025-04-29 RX ADMIN — BUSPIRONE HYDROCHLORIDE 10 MG: 10 TABLET ORAL at 08:55

## 2025-04-29 RX ADMIN — AMLODIPINE BESYLATE 10 MG: 10 TABLET ORAL at 08:55

## 2025-04-29 RX ADMIN — SUCRALFATE 1 G: 1 TABLET ORAL at 06:03

## 2025-04-29 RX ADMIN — ACETAMINOPHEN 1000 MG: 500 TABLET ORAL at 05:56

## 2025-04-29 RX ADMIN — PANTOPRAZOLE SODIUM 20 MG: 20 TABLET, DELAYED RELEASE ORAL at 06:05

## 2025-04-29 RX ADMIN — HYDROCHLOROTHIAZIDE 25 MG: 25 TABLET ORAL at 08:55

## 2025-04-29 RX ADMIN — ALPRAZOLAM 0.5 MG: 0.5 TABLET ORAL at 05:56

## 2025-04-29 RX ADMIN — SIMETHICONE 80 MG: 80 TABLET, CHEWABLE ORAL at 13:22

## 2025-04-29 RX ADMIN — ACETAMINOPHEN 1000 MG: 500 TABLET ORAL at 13:59

## 2025-04-29 RX ADMIN — HEPARIN SODIUM 5000 UNITS: 5000 INJECTION INTRAVENOUS; SUBCUTANEOUS at 12:56

## 2025-04-29 ASSESSMENT — COGNITIVE AND FUNCTIONAL STATUS - GENERAL
WALKING IN HOSPITAL ROOM: TOTAL
STANDING UP FROM CHAIR USING ARMS: A LITTLE
CLIMB 3 TO 5 STEPS WITH RAILING: A LOT
STANDING UP FROM CHAIR USING ARMS: A LOT
DAILY ACTIVITIY SCORE: 23
DRESSING REGULAR LOWER BODY CLOTHING: A LITTLE
MOVING FROM LYING ON BACK TO SITTING ON SIDE OF FLAT BED WITH BEDRAILS: A LITTLE
TURNING FROM BACK TO SIDE WHILE IN FLAT BAD: A LITTLE
MOVING TO AND FROM BED TO CHAIR: A LOT
WALKING IN HOSPITAL ROOM: A LITTLE
MOBILITY SCORE: 12
DRESSING REGULAR LOWER BODY CLOTHING: A LITTLE
MOBILITY SCORE: 19
CLIMB 3 TO 5 STEPS WITH RAILING: TOTAL
PERSONAL GROOMING: A LITTLE
MOVING TO AND FROM BED TO CHAIR: A LITTLE
MOVING TO AND FROM BED TO CHAIR: A LITTLE
DAILY ACTIVITIY SCORE: 22

## 2025-04-29 ASSESSMENT — PAIN SCALES - GENERAL
PAINLEVEL_OUTOF10: 0 - NO PAIN
PAINLEVEL_OUTOF10: 6
PAINLEVEL_OUTOF10: 0 - NO PAIN

## 2025-04-29 ASSESSMENT — PAIN - FUNCTIONAL ASSESSMENT
PAIN_FUNCTIONAL_ASSESSMENT: 0-10
PAIN_FUNCTIONAL_ASSESSMENT: 0-10
PAIN_FUNCTIONAL_ASSESSMENT: FLACC (FACE, LEGS, ACTIVITY, CRY, CONSOLABILITY)
PAIN_FUNCTIONAL_ASSESSMENT: 0-10
PAIN_FUNCTIONAL_ASSESSMENT: FLACC (FACE, LEGS, ACTIVITY, CRY, CONSOLABILITY)
PAIN_FUNCTIONAL_ASSESSMENT: 0-10
PAIN_FUNCTIONAL_ASSESSMENT: 0-10

## 2025-04-29 ASSESSMENT — PAIN DESCRIPTION - LOCATION: LOCATION: FOOT

## 2025-04-29 ASSESSMENT — ACTIVITIES OF DAILY LIVING (ADL): ADL_ASSISTANCE: INDEPENDENT

## 2025-04-29 ASSESSMENT — PAIN DESCRIPTION - DESCRIPTORS: DESCRIPTORS: ACHING;DISCOMFORT

## 2025-04-29 ASSESSMENT — PAIN DESCRIPTION - ORIENTATION: ORIENTATION: LEFT

## 2025-04-29 NOTE — PROGRESS NOTES
Pt pod#1. Referrals sent to [facility], [facility] and [facility], no precert needed, await responses.      04/29/25 0806   Discharge Planning   Expected Discharge Disposition SNF

## 2025-04-29 NOTE — NURSING NOTE
Pt resting in bed.No complaints. Dsg to Rt knee remains C/D/I. Pt slept for short periods during shift. Post op antibiotic completed. Pt was medicated for pain. No other changes noted from initial shift assessment. SCDs, Call light in reach.

## 2025-04-29 NOTE — OP NOTE
ORIF, DIGIT (L) Operative Note     Date: 2025  OR Location: [facility]    Name: Francine Romero, : 1940, Age: 84 y.o., MRN: [id number], Sex: female    Diagnosis  Pre-op Diagnosis      * Closed displaced fracture of metatarsal bone, unspecified laterality, unspecified metatarsal, initial encounter [S92.309A] Post-op Diagnosis     * Closed displaced fracture of metatarsal bone, unspecified laterality, unspecified metatarsal, initial encounter [S92.309A]     Procedures  25604 - PA OPEN TREATMENT METATARSAL FRACTURE EACH (x2)      Surgeons      * Rachel French - Primary    Resident/Fellow/Other Assistant:  Surgeons and Role:     * Jim Kerns DPM - Resident - Assisting     * Aimee Quiñones DPM - Resident - Assisting    Staff:   Circulator: Ermelinda Raya Person: Deja Roberts Circulator: Candace Roberts Scrub: Colleen    Anesthesia Staff: Anesthesiologist: Kalani Bruce MD  CRNA: KENN Newton-MARQUES    Procedure Summary  Anesthesia: General  ASA: II  Estimated Blood Loss: 5 mL  Intra-op Medications: Administrations occurring from 1345 to 1550 on 25:  * No intraprocedure medications in log *           Anesthesia Record               Intraprocedure I/O Totals          Intake    lactated Ringer's infusion 750.00 mL    ceFAZolin (Ancef) 2 g in dextrose (iso) IV 50 mL 50.00 mL    Total Intake 800 mL       Output    Est. Blood Loss 10 mL    Total Output 10 mL       Net    Net Volume 790 mL          Specimen: No specimens collected              Drains and/or Catheters:   External Urinary Catheter Female (Active)   Present on Admission to Healthcare Facility N 25 1526   Wall Suction (mmHg) 50 25 1526   External Catheter Status Changed 25 1100   Securement Method Mesh panties 25 1209   Output (mL) 50 mL 25 0900       Tourniquet Times:     Total Tourniquet Time Documented:  Thigh (Left) - 92 minutes  Total: Thigh (Left) - 92 minutes      Implants:  Implants       Type  Name Action Serial No.      Screw T-PLATE, NARROW LOCKING, 2.0/2 X 10 HOLES, L 62MM - [id number] Implanted      Screw K-WIRE, 1.6MM W/ STOP - [id number] Used, Not Implanted      Screw SCREW, LOCKING, T6, 2.0 X 12MM - [id number] Implanted      Screw SCREW, LOCKING, T6, 2.0 X 14MM - [id number] Implanted      Screw PLATE, NARROW LOCKING, 2.0/ 8 HOLES, L 46MM - [id number] Implanted      Screw SCREW, LOCKING, T6, 2.0 X 16MM - [id number] Implanted      Screw SCREW, LOCKING, T6, 2.0 X 10MM - [id number] Implanted               Findings: left fourth and fifth metatarsal fractures; left fifth avulsion fracture    Indications: Francine Romero is an 84 y.o. female who is having surgery for Closed displaced fracture of metatarsal bone, unspecified laterality, unspecified metatarsal, initial encounter [S92.309A].     The patient was seen in the preoperative area. The risks, benefits, complications, treatment options, non-operative alternatives, expected recovery and outcomes were discussed with the patient. The possibilities of reaction to medication, pulmonary aspiration, injury to surrounding structures, bleeding, recurrent infection, the need for additional procedures, failure to diagnose a condition, and creating a complication requiring transfusion or operation were discussed with the patient. The patient concurred with the proposed plan, giving informed consent.  The site of surgery was properly noted/marked if necessary per policy. The patient has been actively warmed in preoperative area. Preoperative antibiotics have been ordered and given within 1 hours of incision. Venous thrombosis prophylaxis have been ordered including unilateral sequential compression device    Procedure Details:     PROCEDURAL NOTE  The patient was brought to the operating room and positioned supine on the operating room table.  A time-out was performed, identifying the patient by name, medical record number, date of birth, site of procedure, and  procedure to be performed.  The pt received their scheduled antibiotics and SCDs were placed for DVT prophylaxis. General anesthesia was induced by the Anesthesiology team.  An oral endotracheal tube was inserted and local anesthesia was administered by the podiatry team to the appropriate extremity using 10 ml's 1:1 mixture 0.5% marcaine and 1% lidocaine. All bony prominences were well padded. Patient was then secured with safety straps.  Attention was drawn to the left extremity. A well padded tourniquet was applied to the left thigh. The area was then prepped and draped in the usual sterile fashion.     Utilizing fluoroscopy, anatomical landmarks were made with a skin marker. A 5 cm incision was made overlying the fourth metatarsal utilizing a #15 blade through subcutaneous tissue. The incision was carried down through anatomical layers until the fracture was visualized. The fracture was noted to be externally rotated. The toe was distracted the fracture was reduced; however since the incident of fracture was about 3 weeks ago there was some level of soft callous preventing excellent reduction of the fracture site. A locking T plate was placed across the fracture site. The c-arm was utilized to confirm adequate placement of the plate and reduction of the fracture fragment. The plate was noted to be placed slightly lateral on the fourth metatarsal, however the reduction of the fracture sight was well aligned and there was adequate bridging across the comminuted fracture site. Appropriately sized screws were placed distal and proximal to the fracture site in a bridge plate fashion.    Attention was then directed to the fifth ray and a 6 cm long incision was performed to the level of subcutaneous tissue.   The incision was carried down through anatomical layers until the fracture was visualized. The capital fragment appeared to be medially translocated. The toe was distracted the fracture was reduced; however since  the incident of fracture was about 3 weeks ago there was some level of soft callous preventing excellent reduction of the fracture site. A narrow locking plate was placed across the fracture site. The c-arm was utilized to confirm adequate placement of the plate and reduction of the fracture fragment with adequate bridging across the comminuted fracture site. Appropriately sized screws were placed distal and proximal to the fracture site in a bridge plate fashion.    Dressings: adaptic, gauze, ABD, denise, ACE, well padded posterior splint    Tourniquet was deflated and perfusion was noted to all digits.    The patient tolerated the procedure well, awakened from anesthesia without any difficulties, and was transferred to the patient in stable condition.    POST OP PLAN:  Patient will be sent to the floors and managed by primary team. All medications may be restarted per primary team and pt may resume normal diet. If prominent strikethrough from dressing, please reinforce dressing with DSD. Podiatry will continue to follow pt and assess pt's status and surgical site tomorrow.   Evidence of Infection:   Complications:  None; patient tolerated the procedure well.    Disposition: PACU - hemodynamically stable.  Condition: stable                 Additional Details:     Attending Attestation:     Rachel French  Phone Number: [phone]

## 2025-04-29 NOTE — PROGRESS NOTES
"Francine Romero is a 84 y.o. female on day 3 of admission presenting with Closed displaced fracture of metatarsal bone, unspecified laterality, unspecified metatarsal, initial encounter.    Subjective   Pt was seen resting comfortably in bed and NAD. Pt notes minor pain to the surgical site however is well managed with pain medication. She is s/p left fourth and fifth metatarsal fracture open reduction internal fixation (DOS: 4/28/25). Pt has no other pedal complaints and denies constitutional symptoms.        Physical Exam    Objective     REVIEW OF SYSTEMS  A 10 point review of systems examination was performed and otherwise negative unless indicated in the subjective portion of the note.    Constitutional: AOx3, NAD  Head/Neck: NCAT  Respiratory: Breathing comfortably on room air  Eyes: Clear sclera  Psychological: Appropriate mood and behavior     Lower Extremity Focused Exam:  Vasc:   DP and PT pulses are palpable b/l. CFT brisk to hallux.      Neuro:   Light touch intact to LLE.      MSK:   s/p left fourth and fifth metatarsal fracture open reduction internal fixation (DOS: 4/28/25). Foot is neutral relative to the leg 2/2 posterior splint     Derm:   s/p left fourth and fifth metatarsal fracture open reduction internal fixation (DOS: 4/28/25). Splint and dressings intact.    Last Recorded Vitals  Blood pressure (!) 115/44, pulse 63, temperature 36.6 °C (97.9 °F), temperature source Oral, resp. rate 16, height 1.727 m (5' 8\"), weight 88.6 kg (195 lb 5.2 oz), SpO2 95%.    Intake/Output last 3 Shifts:  I/O last 3 completed shifts:  In: 1300 (14.7 mL/kg) [P.O.:500; I.V.:750 (8.5 mL/kg); IV Piggyback:50]  Out: 310 (3.5 mL/kg) [Urine:300 (0.1 mL/kg/hr); Blood:10]  Weight: 88.6 kg     Relevant Results           Assessment & Plan  Closed displaced fracture of metatarsal bone, unspecified laterality, unspecified metatarsal, initial encounter    Hyperlipidemia    HTN (hypertension)    PUD (peptic ulcer " disease)    Depression    s/p left fourth and fifth metatarsal fracture open reduction internal fixation (DOS: 4/28/25)    Acute closed displaced fracture of metatarsal bones 4 and 5, left (initial encounter)  Osteopenia  Peripheral neuropathy 2/2 spinal stenosis     Plan:  - Patient was seen at bedside and is resting comfortably.  A total of 55 minutes of face-to-face time was spent on this patient for professional services including but not limited to obtaining medially appropriate history, performing physical examination, collecting and explaining pertinent findings to patient, discussing relevant and viable treatment options, making appropriate level of medical decision, and coordinating care and facilitating treatment.  The end of the encounter was spent educating patient and family on treatment plan. All questions and concerns were answered and addressed to the pt's apparent satisfaction.  - Patient was evaluated at bedside  - Posterior splint to be continued to LLE.   - NWB to left lower extremity.  - PT ordered as patient will have extended period of NWB post operatively   - Podiatry highly recommends patient go to rehab facility. Discussed with family about this issue and it would be in her best interest to be discharged to facility once medically optimized.        Jim Kerns DPM (Surgical Fellow)  Podiatric Medicine & Surgery  Please Haiku message me with any questions or concerns.     Jim Kerns DPM

## 2025-04-29 NOTE — NURSING NOTE
Assumed care of patient. Patient is resting in bed. She is about to order her breakfast. She was already medicated for pain at 0556. She stated that she is about 0-1 on pain scale. She was made aware that therapy would be in to work with her this morning. Call light is in place will continue to monitor.

## 2025-04-29 NOTE — CARE PLAN
The patient's goals for the shift include Try and rest.    The clinical goals for the shift include pain control      Problem: Fall/Injury  Goal: Not fall by end of shift  Outcome: Progressing  Goal: Be free from injury by end of the shift  Outcome: Progressing  Goal: Verbalize understanding of personal risk factors for fall in the hospital  Outcome: Progressing  Goal: Verbalize understanding of risk factor reduction measures to prevent injury from fall in the home  Outcome: Progressing  Goal: Use assistive devices by end of the shift  Outcome: Progressing  Goal: Pace activities to prevent fatigue by end of the shift  Outcome: Progressing     Problem: Safety - Adult  Goal: Free from fall injury  Outcome: Progressing     Problem: Discharge Planning  Goal: Discharge to home or other facility with appropriate resources  Outcome: Progressing     Problem: Chronic Conditions and Co-morbidities  Goal: Patient's chronic conditions and co-morbidity symptoms are monitored and maintained or improved  Outcome: Progressing     Problem: Nutrition  Goal: Nutrient intake appropriate for maintaining nutritional needs  Outcome: Progressing     Problem: Pain  Goal: Takes deep breaths with improved pain control throughout the shift  Outcome: Progressing  Goal: Turns in bed with improved pain control throughout the shift  Outcome: Progressing  Goal: Walks with improved pain control throughout the shift  Outcome: Progressing  Goal: Performs ADL's with improved pain control throughout shift  Outcome: Progressing  Goal: Participates in PT with improved pain control throughout the shift  Outcome: Progressing  Goal: Free from opioid side effects throughout the shift  Outcome: Progressing  Goal: Free from acute confusion related to pain meds throughout the shift  Outcome: Progressing     Problem: Skin  Goal: Decreased wound size/increased tissue granulation at next dressing change  Outcome: Progressing  Flowsheets (Taken 4/29/2025  1352)  Decreased wound size/increased tissue granulation at next dressing change:   Promote sleep for wound healing   Protective dressings over bony prominences   Utilize specialty bed per algorithm  Goal: Participates in plan/prevention/treatment measures  Outcome: Progressing  Flowsheets (Taken 4/29/2025 1352)  Participates in plan/prevention/treatment measures:   Discuss with provider PT/OT consult   Elevate heels   Increase activity/out of bed for meals  Goal: Prevent/manage excess moisture  Outcome: Progressing  Flowsheets (Taken 4/29/2025 1352)  Prevent/manage excess moisture:   Cleanse incontinence/protect with barrier cream   Monitor for/manage infection if present   Follow provider orders for dressing changes   Use wicking fabric (obtain order)   Moisturize dry skin  Goal: Prevent/minimize sheer/friction injuries  Outcome: Progressing  Flowsheets (Taken 4/29/2025 1352)  Prevent/minimize sheer/friction injuries:   Complete micro-shifts as needed if patient unable. Adjust patient position to relieve pressure points, not a full turn   Increase activity/out of bed for meals   Use pull sheet   HOB 30 degrees or less   Turn/reposition every 2 hours/use positioning/transfer devices   Utilize specialty bed per algorithm  Goal: Promote/optimize nutrition  Outcome: Progressing  Flowsheets (Taken 4/29/2025 1352)  Promote/optimize nutrition:   Assist with feeding   Offer water/supplements/favorite foods   Monitor/record intake including meals   Consume > 50% meals/supplements  Goal: Promote skin healing  Outcome: Progressing  Flowsheets (Taken 4/29/2025 1352)  Promote skin healing:   Assess skin/pad under line(s)/device(s)   Protective dressings over bony prominences   Turn/reposition every 2 hours/use positioning/transfer devices   Ensure correct size (line/device) and apply per  instructions   Rotate device position/do not position patient on device

## 2025-04-29 NOTE — PROGRESS NOTES
04/29/25 1345   Discharge Planning   Expected Discharge Disposition SNF  ([facility])   Does the patient need discharge transport arranged? Yes   RoundTrip coordination needed? Yes   Has discharge transport been arranged? Yes   What day is the transport expected? 04/29/25   What time is the transport expected? 0300

## 2025-04-29 NOTE — ASSESSMENT & PLAN NOTE
Date of service: 4/29/2025    Plan: Continue current medication.  Patient medically stable for discharge when arrangements are made.  Discussed with the patient and her daughter at the bedside.

## 2025-04-29 NOTE — PROGRESS NOTES
Francine Romero is a 84 y.o. female on day 3 of admission presenting with Closed displaced fracture of metatarsal bone, unspecified laterality, unspecified metatarsal, initial encounter.    Subjective   Patient was seen and examined lying in the bed.  Comfortable.  Alic in [place].  Patient denied any headache or dizziness.  No nausea or vomiting.  No fever chills or rigor.   Still has the pain in the left foot.    Objective     Physical Exam  HEENT:  Head externally atraumatic,  extraocular movements intact, oral mucosa moist  Neck:  Supple, no JVP, no palpable adenopathy or thyromegaly.  No carotid bruit.  Chest:  Clear to auscultation and resonant.  Heart:  Regular rate and rhythm, no murmur or gallop could be appreciated.  Abdomen:  Soft, nontender, bowel sounds present, normoactive, no palpable hepatosplenomegaly.  Extremities:  No edema, pulses present, no cyanosis or clubbing.  Left leg in a cast.  CNS:  Patient alert, oriented to time, place and person.    No new deficit.  Cranial nerves 2-12 grossly intact  Skin:  No active rash.  Musculoskeletal:  No  apparent joint swelling or erythema, range of movement normal.  Last Recorded Vitals  Heart Rate:  [63-97]   Temp:  [35.8 °C (96.4 °F)-36.6 °C (97.9 °F)]   Resp:  [12-16]   BP: (104-143)/(44-88)   Weight:  [88.6 kg (195 lb 5.2 oz)]   SpO2:  [95 %-97 %]     Intake/Output last 3 Shifts:  I/O last 3 completed shifts:  In: 1300 (14.7 mL/kg) [P.O.:500; I.V.:750 (8.5 mL/kg); IV Piggyback:50]  Out: 310 (3.5 mL/kg) [Urine:300 (0.1 mL/kg/hr); Blood:10]  Weight: 88.6 kg     Relevant Results  No results found for the last 90 days.    Results for orders placed or performed during the hospital encounter of 04/25/25 (from the past 24 hours)   CBC   Result Value Ref Range    WBC 8.6 4.4 - 11.3 x10*3/uL    nRBC 0.0 0.0 - 0.0 /100 WBCs    RBC 4.03 4.00 - 5.20 x10*6/uL    Hemoglobin 12.4 12.0 - 16.0 g/dL    Hematocrit 37.7 36.0 - 46.0 %    MCV 94 80 - 100 fL    MCH 30.8 26.0  - 34.0 pg    MCHC 32.9 32.0 - 36.0 g/dL    RDW 13.0 11.5 - 14.5 %    Platelets 336 150 - 450 x10*3/uL   Basic Metabolic Panel   Result Value Ref Range    Glucose 119 (H) 74 - 99 mg/dL    Sodium 136 136 - 145 mmol/L    Potassium 4.0 3.5 - 5.3 mmol/L    Chloride 102 98 - 107 mmol/L    Bicarbonate 26 21 - 32 mmol/L    Anion Gap 12 10 - 20 mmol/L    Urea Nitrogen 20 6 - 23 mg/dL    Creatinine 0.79 0.50 - 1.05 mg/dL    eGFR 73 >60 mL/min/1.73m*2    Calcium 9.3 8.6 - 10.3 mg/dL      Current Medications[1]   Assessment/Plan   Assessment & Plan  Closed displaced fracture of metatarsal bone, unspecified laterality, unspecified metatarsal, initial encounter  Essential hypertension  Primary insomnia  Anxiety  Depression  Hyperlipidemia  Osteoarthritis  Depression  Anxiety  Hypertriglyceridemia  Peptic ulcer disease    Plan: Continue current medication.  Supportive care begin physical therapy occupational therapy to control pain.  Per ID consult has been obtained.  Patient will undergo surgical intervention.  We will take DVT, fall, aspiration, decubitus, and DVT precautions.  This has been discussed with the patient and is agreeable to it.    Date of service: 4/26/2025    Plan: Continue current medication.  Bright input appreciated.  Patient to go for the surgical intervention on 4/28/2025.  Control pain.  We will take DVT, fall, aspiration, decubitus, DVT precaution.  This has been discussed with the patient and is agreeable to it.    Date of service: 4/27/2025    Plan: Continue current medication.  Patient is complaining of some pain in the foot.  Otherwise stable.  Patient is also complaining about heparin.  Hold heparin after tonight dose.  Patient can be started on the oral anticoagulants for DVT prophylaxis after the surgery.  Lab work reviewed.  We will treat DVT, fall, aspiration, decubitus, and DVT precaution.  This has been discussed with the patient and she is agreeable to it.  Hyperlipidemia    HTN  (hypertension)    PUD (peptic ulcer disease)    Depression  Date of service: 4/29/2025    Plan: Continue current medication.  Patient medically stable for discharge when arrangements are made.  Discussed with the patient and her daughter at the bedside.           Eitan Maria MD            [1]   Current Facility-Administered Medications:     acetaminophen (Tylenol) tablet 1,000 mg, 1,000 mg, oral, q6h PRN, Eitan Maria MD, 1,000 mg at 04/29/25 0556    albuterol 2.5 mg /3 mL (0.083 %) nebulizer solution 2.5 mg, 2.5 mg, nebulization, Once PRN, Kalani Bruce MD    ALPRAZolam (Xanax) tablet 0.5 mg, 0.5 mg, oral, Daily, Eitan Maria MD, 0.5 mg at 04/29/25 0556    amLODIPine (Norvasc) tablet 10 mg, 10 mg, oral, Daily, Eitan Maria MD, 10 mg at 04/29/25 0855    busPIRone (Buspar) tablet 10 mg, 10 mg, oral, BID, Eitan Maria MD, 10 mg at 04/29/25 0855    calcium carbonate (Tums) chewable tablet 500 mg, 1 tablet, oral, Daily PRN, Eitan Maria MD    cetirizine (ZyrTEC) tablet 10 mg, 10 mg, oral, Daily PRN, Eitan Maria MD    ergocalciferol (Vitamin D-2) capsule 1.25 mg, 1.25 mg, oral, Weekly, Eitan Maria MD, 1.25 mg at 04/25/25 2214    fenofibrate (Triglide) tablet 160 mg, 160 mg, oral, Daily, Eitan Maria MD, 160 mg at 04/28/25 2037    fluticasone (Flonase) nasal spray 1 spray, 1 spray, Each Nostril, Daily PRN, Eitan Maria MD    gabapentin (Neurontin) capsule 300 mg, 300 mg, oral, BID PRN, Eitan Maria MD    heparin (porcine) injection 5,000 Units, 5,000 Units, subcutaneous, q8h, Eitan Maria MD, 5,000 Units at 04/29/25 1256    hydroCHLOROthiazide (HYDRODiuril) tablet 25 mg, 25 mg, oral, q AM, Eitan Maria MD, 25 mg at 04/29/25 0855    HYDROmorphone (Dilaudid) injection 0.4 mg, 0.4 mg, intravenous, q5 min PRN, Kalani Bruce MD, 0.4 mg at 04/28/25 1855    ibuprofen tablet 400 mg, 400 mg, oral, q6h PRN, Eitan Maria MD,  400 mg at 04/28/25 2308    lactated Ringer's infusion, 50 mL/hr, intravenous, Continuous, Kalani Bruce MD, New Bag at 04/28/25 1608    lubiprostone (Amitiza) capsule 8 mcg, 8 mcg, oral, BID, Eitan Maria MD    montelukast (Singulair) tablet 10 mg, 10 mg, oral, Daily before evening meal, Eitan Maria MD, 10 mg at 04/28/25 2037    ondansetron (Zofran) injection 4 mg, 4 mg, intravenous, q6h PRN, Susanne Vasquez, APRN-CNP, 4 mg at 04/28/25 1819    ondansetron (Zofran) injection 4 mg, 4 mg, intravenous, Once PRN, Kalani Bruce MD    oxygen (O2) therapy, , inhalation, Continuous PRN - O2/gases, Kalani Bruce MD    pantoprazole (ProtoNix) EC tablet 20 mg, 20 mg, oral, Daily before breakfast, Eitan Maria MD, 20 mg at 04/29/25 0605    pravastatin (Pravachol) tablet 10 mg, 10 mg, oral, Nightly, Eitan Maria MD, 10 mg at 04/28/25 2037    sertraline (Zoloft) tablet 100 mg, 100 mg, oral, Daily, Eitan Maria MD, 100 mg at 04/28/25 2037    simethicone (Mylicon) chewable tablet 80 mg, 80 mg, oral, 4x daily PRN, Eitan Maria MD, 80 mg at 04/29/25 1322    sucralfate (Carafate) tablet 1 g, 1 g, oral, Daily PRN, Eitan Maria MD, 1 g at 04/29/25 0603    temazepam (Restoril) capsule 15 mg, 15 mg, oral, Nightly PRN, Eitan Maria MD, 15 mg at 04/28/25 2308

## 2025-04-29 NOTE — CARE PLAN
The patient's goals for the shift include Try and rest.    The clinical goals for the shift include pain management, stable vs/labs, surgery

## 2025-04-29 NOTE — PROGRESS NOTES
Physical Therapy    Physical Therapy Re-Evaluation & Treatment    Patient Name: Francine Romero  MRN: [id number]  Department: [address]  Room: [address] [place] [address]  Today's Date: 4/29/2025   Time Calculation  Start Time: 1150  Stop Time: 1225  Time Calculation (min): 35 min    Assessment/Plan   PT Assessment  PT Assessment Results: Decreased strength, Decreased mobility, Decreased range of motion, Decreased endurance, Impaired balance, Decreased safety awareness, Impaired hearing, Impaired sensation, Pain  Rehab Prognosis: Good  Barriers to Discharge Home: Caregiver assistance, Physical needs  Caregiver Assistance: Caregiver assistance needed per identified barriers - however, level of patient's required assistance exceeds assistance available at home  Physical Needs: Ambulating household distances limited by function/safety, 24hr mobility assistance needed, High falls risk due to function or environment  Evaluation/Treatment Tolerance: Patient tolerated treatment well  Medical Staff Made Aware: Yes  Strengths: Ability to acquire knowledge, Attitude of self, Support of extended family/friends  Barriers to Participation: Comorbidities  End of Session Communication: Bedside nurse  Assessment Comment: pt demonstrates decline in functional mobility compared to baseline level. pt with impaired strength, ROM, balance and overall decreased activity tolerance this date secondary to weakness and fatigue. pt would benefit from continued skilled PT to address the above deficits in order to maximize functional performance and safety.  End of Session Patient Position: Up in chair, Alarm on (call bell in reach, all needs met. RN aware)   IP OR SWING BED PT PLAN  Inpatient or Swing Bed: Inpatient  PT Plan  Treatment/Interventions: Bed mobility, Transfer training, Gait training, Stair training, Balance training, Neuromuscular re-education, Strengthening, Endurance training, Range of motion, Therapeutic exercise, Therapeutic activity, Home  exercise program, Positioning, Postural re-education  PT Plan: Ongoing PT  PT Frequency: 5 times per week  PT Discharge Recommendations: Moderate intensity level of continued care  Equipment Recommended upon Discharge: Wheeled walker, Wheelchair  PT Recommended Transfer Status: Assist x2, Assistive device (SW)  PT - OK to Discharge: Yes (with skilled PT at next level of care)      Subjective     General Visit Information:  General  Reason for Referral: Impaired functional mobility due to decreased muscular strength. This 84 year old female was admitted for acute intra-articular fx of L 5th metatarsal base and acute comminuted fxs of mid & distal shafts of L 4th & 5th metatarsals. pt is s/p ORIF L LE on 4/28 by Dr. Kerns.  Referred By: Aimee Quiñones DPM  Past Medical History Relevant to Rehab: anxiety, HTN, L TKA  Family/Caregiver Present: Yes  Caregiver Feedback: son at bedside upon arrival; spoke with son and daughter after session in family waiting room  Prior to Session Communication: Bedside nurse  Patient Position Received: Bed, 3 rail up, Alarm off, not on at start of session  Preferred Learning Style: verbal, visual  General Comment: pt cleared for therapy via RN, agreeable to participate; +tele, purewick  Home Living:  Home Living  Type of Home: Apartment  Lives With: Alone  Home Adaptive Equipment: Cane, Walker rolling or standard  Home Layout: One level  Home Access: Level entry (elevator to apt)  Bathroom Shower/Tub: Walk-in shower  Bathroom Equipment: Grab bars in shower  Home Living Comments: Children and grandchildren in area and supportive.  Prior Level of Function:  Prior Function Per Pt/Caregiver Report  Level of Andrew: Independent with ADLs and functional transfers  ADL Assistance: Independent  Homemaking Assistance: Independent (has cleaning lady)  Ambulatory Assistance: Independent (PRN cane use. Denied any other falls in the 6 months)  Prior Function Comments: (+)  driving  Precautions:  Precautions  Hearing/Visual Limitations: bilateral hearing aids (not with her), glasses at all times  LE Weight Bearing Status: Left Non-Weight Bearing  Medical Precautions: Fall precautions  Precautions Comment: reviewed L LE NWBing precautions with visual demonstration for transfers and ambulation with use of SW           Objective   Pain:  Pain Assessment  Pain Assessment: 0-10  0-10 (Numeric) Pain Score: 0 - No pain  Cognition:  Cognition  Overall Cognitive Status: Within Functional Limits  Orientation Level: Oriented X4    General Assessments:  General Observation  General Observation: pt is alert, pleasant and cooperative; posterior fiberglass splint in place at L ankle/foot               Activity Tolerance  Endurance: Decreased tolerance for upright activites  Activity Tolerance Comments: poor+  Rate of Perceived Exertion (RPE): 7/10    Sensation  Light Touch: No apparent deficits  Sensation Comment: reports neuropathy/paresthesias    Strength  Strength Comments: R LE >/= 3/5; L LE not tested- NWB with fiberglass splint in place  Strength  Strength Comments: R LE >/= 3/5; L LE not tested- NWB with fiberglass splint in place           Coordination  Movements are Fluid and Coordinated: No  Coordination Comment: decreased rate and accuracy of movement; L LE NWB    Postural Control  Postural Control: Within Functional Limits    Static Sitting Balance  Static Sitting-Balance Support: Bilateral upper extremity supported (R LE supported)  Static Sitting-Level of Assistance: Close supervision  Static Sitting-Comment/Number of Minutes: good+  Dynamic Sitting Balance  Dynamic Sitting-Balance Support: Bilateral upper extremity supported (R LE supported)  Dynamic Sitting-Level of Assistance: Close supervision  Dynamic Sitting-Comments: good- performing ther ex on EOB    Static Standing Balance  Static Standing-Balance Support: Bilateral upper extremity supported (on SW)  Static Standing-Level of  Assistance: Maximum assistance  Static Standing-Comment/Number of Minutes: poor+  Dynamic Standing Balance  Dynamic Standing-Balance Support: Bilateral upper extremity supported (on SW)  Dynamic Standing-Level of Assistance: Maximum assistance  Dynamic Standing-Balance: Turning  Dynamic Standing-Comments: stand pivot to chair; poor+  Functional Assessments:  Bed Mobility  Bed Mobility: Yes  Bed Mobility 1  Bed Mobility 1: Supine to sitting  Level of Assistance 1: Minimum assistance  Bed Mobility Comments 1: min A x 1 to guide L LE off EOB; pt able to independently elevate trunk with use of bed rail  Bed Mobility 2  Bed Mobility  2: Scooting  Level of Assistance 2: Close supervision  Bed Mobility Comments 2: pt able to square hips on EOB independently; effortful    Transfers  Transfer: Yes  Transfer 1  Transfer From 1: Bed to  Transfer to 1: Stand  Technique 1: Sit to stand  Transfer Device 1: Walker  Transfer Level of Assistance 1: Maximum assistance  Trials/Comments 1: sit > stand with max A x 1 for fwd weight shift and trunk elevation; VCs for safe hand placement and L LE placement  Transfers 2  Transfer From 2: Stand to  Transfer to 2: Chair with arms  Technique 2: Stand to sit  Transfer Device 2: Walker  Transfer Level of Assistance 2: Maximum assistance  Trials/Comments 2: max A x 1 for controlled ecc lowering into chair; pt maintained B UEs on SW despite VCs; VCs for safe L LE placement    Ambulation/Gait Training  Ambulation/Gait Training Performed: Yes  Ambulation/Gait Training 1  Surface 1: Level tile  Device 1: Standard walker  Assistance 1: Maximum assistance  Quality of Gait 1: Narrow base of support, Soft knee(s)  Comments/Distance (ft) 1: pt attempted x1 hop fwd with significant difficulty and LOB requiring increase assist to stabilize; pt transferred bed > chair by pivoting on R LE    Stairs  Stairs: No  Extremity/Trunk Assessments:  RLE   RLE :  (grossly >/= 3/5)  LLE   LLE : Exceptions to WFL (L LE  NWB secondary to ORIF)  Treatments:  Therapeutic Exercise  Therapeutic Exercise Performed: Yes  Therapeutic Exercise Activity 1: supine R LE APs x 20 reps  Therapeutic Exercise Activity 2: supine beverley QSs x 10 reps x3 sec hold each  Therapeutic Exercise Activity 3: supine GSs x10 reps x3 sec hold each  Therapeutic Exercise Activity 4: seated on EOB: beverley LAQs x10 reps (with VCs and visual demo for correct form)  Therapeutic Exercise Activity 5: seated on EOB: hip flexion x10 reps beverley    Therapeutic Activity  Therapeutic Activity Performed: Yes  Therapeutic Activity 1: refer to bed mobility, transfers and amb with SW    Bed Mobility  Bed Mobility: Yes  Bed Mobility 1  Bed Mobility 1: Supine to sitting  Level of Assistance 1: Minimum assistance  Bed Mobility Comments 1: min A x 1 to guide L LE off EOB; pt able to independently elevate trunk with use of bed rail  Bed Mobility 2  Bed Mobility  2: Scooting  Level of Assistance 2: Close supervision  Bed Mobility Comments 2: pt able to square hips on EOB independently; effortful    Ambulation/Gait Training  Ambulation/Gait Training Performed: Yes  Ambulation/Gait Training 1  Surface 1: Level tile  Device 1: Standard walker  Assistance 1: Maximum assistance  Quality of Gait 1: Narrow base of support, Soft knee(s)  Comments/Distance (ft) 1: pt attempted x1 hop fwd with significant difficulty and LOB requiring increase assist to stabilize; pt transferred bed > chair by pivoting on R LE  Transfers  Transfer: Yes  Transfer 1  Transfer From 1: Bed to  Transfer to 1: Stand  Technique 1: Sit to stand  Transfer Device 1: Walker  Transfer Level of Assistance 1: Maximum assistance  Trials/Comments 1: sit > stand with max A x 1 for fwd weight shift and trunk elevation; VCs for safe hand placement and L LE placement  Transfers 2  Transfer From 2: Stand to  Transfer to 2: Chair with arms  Technique 2: Stand to sit  Transfer Device 2: Walker  Transfer Level of Assistance 2: Maximum  assistance  Trials/Comments 2: max A x 1 for controlled ecc lowering into chair; pt maintained B UEs on SW despite VCs; VCs for safe L LE placement    Stairs  Stairs: No       Outcome Measures:  [facility] Basic Mobility  Turning from your back to your side while in a flat bed without using bedrails: A little  Moving from lying on your back to sitting on the side of a flat bed without using bedrails: A little  Moving to and from bed to chair (including a wheelchair): A lot  Standing up from a chair using your arms (e.g. wheelchair or bedside chair): A lot  To walk in hospital room: Total  Climbing 3-5 steps with railing: Total  Basic Mobility - Total Score: 12    Encounter Problems       Encounter Problems (Active)       PT Problem       Pt will transition supine<>sit with mod I. (Progressing)       Start:  04/26/25    Expected End:  05/16/25            Pt will transfer sit<>stand with FWW and close S while maintaining LLE NWB. (Progressing)       Start:  04/26/25    Expected End:  05/16/25            Pt will transfer bed<>chair with FWW and close S while maintaining LLE NWB. (Progressing)       Start:  04/26/25    Expected End:  05/16/25            Pt will ambulate >/=5 ft with FWW and CGA while maintaining LLE NWB. (Progressing)       Start:  04/26/25    Expected End:  05/16/25                   Education Documentation  Mobility Training, taught by Bran Rock, PT at 4/29/2025  1:11 PM.  Learner: Patient  Readiness: Acceptance  Method: Explanation, Demonstration  Response: Needs Reinforcement  Comment: Reviewed POC and safe mobility techniques    Education Comments  No comments found.

## 2025-04-29 NOTE — DISCHARGE SUMMARY
Discharge Diagnosis  Closed displaced fracture of metatarsal bone, unspecified laterality, unspecified metatarsal, initial encounter           Issues Requiring Follow-Up  Left foot metatarsal fractures    Discharge Meds     Medication List      CONTINUE taking these medications     acetaminophen 500 mg tablet; Commonly known as: Tylenol   ALPRAZolam 0.5 mg tablet; Commonly known as: Xanax   amLODIPine 10 mg tablet; Commonly known as: Norvasc   busPIRone 10 mg tablet; Commonly known as: Buspar   CENTRUM WOMEN ORAL   cetirizine 10 mg tablet; Commonly known as: ZyrTEC   ergocalciferol 1250 mcg (50,000 units) capsule; Commonly known as:   Vitamin D-2   fenofibrate 145 mg tablet; Commonly known as: Tricor   fluticasone 50 mcg/actuation nasal spray; Commonly known as: Flonase   gabapentin 300 mg capsule; Commonly known as: Neurontin   hydroCHLOROthiazide 25 mg tablet; Commonly known as: HYDRODiuril   ibuprofen 200 mg tablet   lubiprostone 8 mcg capsule; Commonly known as: Amitiza   montelukast 10 mg tablet; Commonly known as: Singulair   omeprazole 40 mg DR capsule; Commonly known as: PriLOSEC   pravastatin 10 mg tablet; Commonly known as: Pravachol   simethicone 80 mg chewable tablet; Commonly known as: Mylicon   sucralfate 1 gram tablet; Commonly known as: Carafate   temazepam 15 mg capsule; Commonly known as: Restoril   Tums 500 mg (200 mg elemental) chewable tablet; Generic drug: calcium   carbonate   Zoloft 100 mg tablet; Generic drug: sertraline       Test Results Pending At Discharge  Pending Labs       No current pending labs.            Hospital Course   Patient was admitted with a complaint of pain in the left foot.  The patient had a fractures of metatarsal bones.  Patient underwent open treatment of metatarsal fractures.  The patient tolerated the procedure well.  The patient being discharged to rehab in stable condition.    Pertinent Physical Exam At Time of Discharge  Physical Exam    Outpatient  Follow-Up  Future Appointments   Date Time Provider Department Center   9/3/2025  1:45 PM Emanuel Hall MD [id number]         Eitan Maria MD

## 2025-05-02 NOTE — DOCUMENTATION CLARIFICATION NOTE
"    PATIENT:               REGGIE HUMPHRIES  ACCT #:                  [id number]  MRN:                       [id number]  :                       1940  ADMIT DATE:       2025 1:35 PM  DISCH DATE:        2025 5:01 PM  RESPONDING PROVIDER #:        [id number]          PROVIDER RESPONSE TEXT:    Traumatic fracture of closed displaced fracture of metatarsal bones 4 and 5, left    CDI QUERY TEXT:    Clarification        Instruction:    Based on your assessment of the patient and the clinical information, please provide the requested documentation by clicking on the appropriate radio button and enter any additional information if prompted.    Question: Please further specify the type of fracture and/or site as    When answering this query, please exercise your independent professional judgment. The fact that a question is being asked, does not imply that any particular answer is desired or expected.    The patient's clinical indicators include:  Clinical Information:  Progress Note 25 by Dr. Jim Kerns:    \"84 y.o. female on day 3 of admission presenting with Closed displaced fracture of metatarsal bone, unspecified laterality, unspecified metatarsal, initial encounter.\"    Clinical Indicators:    Progress Note 25 by Dr. Jim Kerns:  \"Acute closed displaced fracture of metatarsal bones 4 and 5, left (initial encounter)  Osteopenia\"    H/P 25 by Dr. Eitan Maria:  \"84 y.o. female presenting with complaint of fall and was found to have a left foot fracture. The patient recently moved to assisted living. The patient says she had a fall about 3 weeks ago and was and came to the emergency room\"    Treatment: s/p left fourth and fifth metatarsal fracture open reduction internal fixation    Risk Factors: Osteopenia,  had a fall about 3 weeks ago  Options provided:  -- Traumatic fracture of closed displaced fracture of metatarsal bones 4 and 5, left, Please specify additional information below  -- " Pathologic fracture r/t Osteopenia of closed displaced fracture of metatarsal bones 4 and 5, left  -- Other - I will add my own diagnosis  -- Refer to Clinical Documentation Reviewer    Query created by: Daniela Storey on 5/1/2025 10:59 AM      Electronically signed by:  SONALI LUNA DPM 5/1/2025 9:57 PM

## 2025-07-29 ENCOUNTER — APPOINTMENT (OUTPATIENT)
Dept: OPHTHALMOLOGY | Facility: CLINIC | Age: 85
End: 2025-07-29
Payer: MEDICARE

## 2025-09-03 ENCOUNTER — APPOINTMENT (OUTPATIENT)
Dept: OPHTHALMOLOGY | Facility: CLINIC | Age: 85
End: 2025-09-03
Payer: MEDICARE

## 2025-10-31 ENCOUNTER — APPOINTMENT (OUTPATIENT)
Dept: OPHTHALMOLOGY | Facility: CLINIC | Age: 85
End: 2025-10-31
Payer: MEDICARE

## (undated) DEVICE — APPLICATOR, CHLORAPREP, W/ORANGE TINT, 26ML

## (undated) DEVICE — SUTURE, ETHILON, 3-0, 18 IN, PS1, BLACK

## (undated) DEVICE — BANDAGE, GAUZE, COTTON, STERILE, BULKEE II, 4.5IN X 4.1YD

## (undated) DEVICE — DRESSING, NON-ADHERENT, 3 X 3 IN, STERILE

## (undated) DEVICE — SUTURE, MONOCRYL, 3-0, 27 IN, SH, UNDYED

## (undated) DEVICE — SOLUTION, IRRIGATION, X RX SODIUM CHL 0.9%, 1000ML BTL

## (undated) DEVICE — Device

## (undated) DEVICE — SPONGE, GAUZE, AVANT, STERILE, NONWOVEN, 4PLY, 4 X 4, STANDARD

## (undated) DEVICE — DRESSING, ADAPTIC, NON-ADHERENT, 3 X 8 IN

## (undated) DEVICE — GOWN, SURGICAL, SIRUS, NON REINFORCED, LARGE

## (undated) DEVICE — DRILL BIT, AO, 1.6 X 96MM, SCALED

## (undated) DEVICE — SYRINGE, 10 CC, LUER LOCK

## (undated) DEVICE — DRESSING, ABDOMINAL, TENDERSORB, 8 X 10 IN, STERILE

## (undated) DEVICE — TIP, SUCTION, YANKAUER, FLEXIBLE

## (undated) DEVICE — GLOVE, SURGICAL, PROTEXIS PI , 6.5, PF, LF

## (undated) DEVICE — GLOVE, SURGICAL, PROTEXIS PI BLUE W/NEUTHERA, 6.5, PF, LF

## (undated) DEVICE — SUTURE, MONOCRYL, 3-0, 27 IN, PS-2, UNDYED

## (undated) DEVICE — MASK, AURASTRAIGHT, LARYN, SIZE 4.0

## (undated) DEVICE — NEEDLE, HYPODERMIC, NEEDLE PRO, 25G X 1.5, ORANGE

## (undated) DEVICE — SUTURE, VICRYL PLUS 3-0, SH, 27IN

## (undated) DEVICE — DRAPE KIT, MINI C-ARM

## (undated) DEVICE — SUTURE, VICRYL, 2-0, 27 IN, SH, UNDYED

## (undated) DEVICE — TUBING, SUCTION, 6MM X 10, CLEAN N-COND

## (undated) DEVICE — SUTURE, MONOCRYL, 4-0, 27 IN, PS-2, UNDYED

## (undated) DEVICE — BANDAGE, ESMARK, 4 IN X 12 FT, LF

## (undated) DEVICE — PADDING, WEBRIL, UNDERCAST, STERILE, 6 IN

## (undated) DEVICE — DRESSING, TRANSPARENT, TEGADERM, 4 X 4-3/4 IN